# Patient Record
Sex: FEMALE | Race: WHITE | NOT HISPANIC OR LATINO | Employment: FULL TIME | URBAN - METROPOLITAN AREA
[De-identification: names, ages, dates, MRNs, and addresses within clinical notes are randomized per-mention and may not be internally consistent; named-entity substitution may affect disease eponyms.]

---

## 2023-11-21 DIAGNOSIS — H05.243 CONSTANT EXOPHTHALMOS OF BOTH EYES: Primary | ICD-10-CM

## 2023-11-21 DIAGNOSIS — Z00.00 WELLNESS EXAMINATION: ICD-10-CM

## 2023-12-07 DIAGNOSIS — K59.09 CHRONIC CONSTIPATION: Primary | ICD-10-CM

## 2023-12-13 ENCOUNTER — LAB (OUTPATIENT)
Dept: LAB | Facility: LAB | Age: 48
End: 2023-12-13
Payer: COMMERCIAL

## 2023-12-13 DIAGNOSIS — H05.243 CONSTANT EXOPHTHALMOS OF BOTH EYES: ICD-10-CM

## 2023-12-13 DIAGNOSIS — Z00.00 WELLNESS EXAMINATION: ICD-10-CM

## 2023-12-13 LAB
25(OH)D3 SERPL-MCNC: 31 NG/ML (ref 30–100)
ALBUMIN SERPL BCP-MCNC: 3.6 G/DL (ref 3.4–5)
ALP SERPL-CCNC: 46 U/L (ref 33–110)
ALT SERPL W P-5'-P-CCNC: 7 U/L (ref 7–45)
ANION GAP SERPL CALC-SCNC: 10 MMOL/L (ref 10–20)
AST SERPL W P-5'-P-CCNC: 11 U/L (ref 9–39)
BASOPHILS # BLD AUTO: 0.04 X10*3/UL (ref 0–0.1)
BASOPHILS NFR BLD AUTO: 0.6 %
BILIRUB SERPL-MCNC: 0.4 MG/DL (ref 0–1.2)
BUN SERPL-MCNC: 11 MG/DL (ref 6–23)
CALCIUM SERPL-MCNC: 9.1 MG/DL (ref 8.6–10.6)
CHLORIDE SERPL-SCNC: 108 MMOL/L (ref 98–107)
CHOLEST SERPL-MCNC: 150 MG/DL (ref 0–199)
CHOLESTEROL/HDL RATIO: 4.4
CO2 SERPL-SCNC: 25 MMOL/L (ref 21–32)
CREAT SERPL-MCNC: 0.82 MG/DL (ref 0.5–1.05)
EOSINOPHIL # BLD AUTO: 0.15 X10*3/UL (ref 0–0.7)
EOSINOPHIL NFR BLD AUTO: 2.4 %
ERYTHROCYTE [DISTWIDTH] IN BLOOD BY AUTOMATED COUNT: 12.4 % (ref 11.5–14.5)
EST. AVERAGE GLUCOSE BLD GHB EST-MCNC: 103 MG/DL
GFR SERPL CREATININE-BSD FRML MDRD: 89 ML/MIN/1.73M*2
GLUCOSE SERPL-MCNC: 81 MG/DL (ref 74–99)
HBA1C MFR BLD: 5.2 %
HCT VFR BLD AUTO: 36.4 % (ref 36–46)
HDLC SERPL-MCNC: 34.3 MG/DL
HGB BLD-MCNC: 11.3 G/DL (ref 12–16)
IMM GRANULOCYTES # BLD AUTO: 0.03 X10*3/UL (ref 0–0.7)
IMM GRANULOCYTES NFR BLD AUTO: 0.5 % (ref 0–0.9)
LDLC SERPL CALC-MCNC: 102 MG/DL
LYMPHOCYTES # BLD AUTO: 1.33 X10*3/UL (ref 1.2–4.8)
LYMPHOCYTES NFR BLD AUTO: 21.3 %
MCH RBC QN AUTO: 28.3 PG (ref 26–34)
MCHC RBC AUTO-ENTMCNC: 31 G/DL (ref 32–36)
MCV RBC AUTO: 91 FL (ref 80–100)
MONOCYTES # BLD AUTO: 0.69 X10*3/UL (ref 0.1–1)
MONOCYTES NFR BLD AUTO: 11 %
NEUTROPHILS # BLD AUTO: 4.01 X10*3/UL (ref 1.2–7.7)
NEUTROPHILS NFR BLD AUTO: 64.2 %
NON HDL CHOLESTEROL: 116 MG/DL (ref 0–149)
NRBC BLD-RTO: 0 /100 WBCS (ref 0–0)
PLATELET # BLD AUTO: 317 X10*3/UL (ref 150–450)
POTASSIUM SERPL-SCNC: 4.5 MMOL/L (ref 3.5–5.3)
PROT SERPL-MCNC: 6.8 G/DL (ref 6.4–8.2)
RBC # BLD AUTO: 3.99 X10*6/UL (ref 4–5.2)
SODIUM SERPL-SCNC: 138 MMOL/L (ref 136–145)
TRIGL SERPL-MCNC: 68 MG/DL (ref 0–149)
TSH SERPL-ACNC: 1.84 MIU/L (ref 0.44–3.98)
VLDL: 14 MG/DL (ref 0–40)
WBC # BLD AUTO: 6.3 X10*3/UL (ref 4.4–11.3)

## 2023-12-13 PROCEDURE — 85025 COMPLETE CBC W/AUTO DIFF WBC: CPT

## 2023-12-13 PROCEDURE — 84443 ASSAY THYROID STIM HORMONE: CPT

## 2023-12-13 PROCEDURE — 84445 ASSAY OF TSI GLOBULIN: CPT

## 2023-12-13 PROCEDURE — 82306 VITAMIN D 25 HYDROXY: CPT

## 2023-12-13 PROCEDURE — 80061 LIPID PANEL: CPT

## 2023-12-13 PROCEDURE — 36415 COLL VENOUS BLD VENIPUNCTURE: CPT

## 2023-12-13 PROCEDURE — 80053 COMPREHEN METABOLIC PANEL: CPT

## 2023-12-13 PROCEDURE — 83036 HEMOGLOBIN GLYCOSYLATED A1C: CPT

## 2023-12-14 DIAGNOSIS — D64.9 ANEMIA, UNSPECIFIED TYPE: Primary | ICD-10-CM

## 2023-12-19 LAB — TSI SER-ACNC: <1 TSI INDEX

## 2023-12-27 ENCOUNTER — LAB (OUTPATIENT)
Dept: LAB | Facility: LAB | Age: 48
End: 2023-12-27
Payer: COMMERCIAL

## 2023-12-27 DIAGNOSIS — D64.9 ANEMIA, UNSPECIFIED TYPE: ICD-10-CM

## 2023-12-27 LAB
BASOPHILS # BLD AUTO: 0.04 X10*3/UL (ref 0–0.1)
BASOPHILS NFR BLD AUTO: 0.7 %
EOSINOPHIL # BLD AUTO: 0.13 X10*3/UL (ref 0–0.7)
EOSINOPHIL NFR BLD AUTO: 2.1 %
ERYTHROCYTE [DISTWIDTH] IN BLOOD BY AUTOMATED COUNT: 12.8 % (ref 11.5–14.5)
FERRITIN SERPL-MCNC: 20 NG/ML (ref 8–150)
FOLATE SERPL-MCNC: 16.4 NG/ML
HCT VFR BLD AUTO: 36.8 % (ref 36–46)
HGB BLD-MCNC: 11.4 G/DL (ref 12–16)
IMM GRANULOCYTES # BLD AUTO: 0.03 X10*3/UL (ref 0–0.7)
IMM GRANULOCYTES NFR BLD AUTO: 0.5 % (ref 0–0.9)
IRON SATN MFR SERPL: 19 % (ref 25–45)
IRON SERPL-MCNC: 76 UG/DL (ref 35–150)
LYMPHOCYTES # BLD AUTO: 1.61 X10*3/UL (ref 1.2–4.8)
LYMPHOCYTES NFR BLD AUTO: 26.4 %
MCH RBC QN AUTO: 27.8 PG (ref 26–34)
MCHC RBC AUTO-ENTMCNC: 31 G/DL (ref 32–36)
MCV RBC AUTO: 90 FL (ref 80–100)
MONOCYTES # BLD AUTO: 0.68 X10*3/UL (ref 0.1–1)
MONOCYTES NFR BLD AUTO: 11.1 %
NEUTROPHILS # BLD AUTO: 3.62 X10*3/UL (ref 1.2–7.7)
NEUTROPHILS NFR BLD AUTO: 59.2 %
NRBC BLD-RTO: 0 /100 WBCS (ref 0–0)
PLATELET # BLD AUTO: 297 X10*3/UL (ref 150–450)
PROT SERPL-MCNC: 6.7 G/DL (ref 6.4–8.2)
RBC # BLD AUTO: 4.1 X10*6/UL (ref 4–5.2)
TIBC SERPL-MCNC: 391 UG/DL (ref 240–445)
UIBC SERPL-MCNC: 315 UG/DL (ref 110–370)
VIT B12 SERPL-MCNC: 241 PG/ML (ref 211–911)
WBC # BLD AUTO: 6.1 X10*3/UL (ref 4.4–11.3)

## 2023-12-27 PROCEDURE — 83550 IRON BINDING TEST: CPT

## 2023-12-27 PROCEDURE — 84165 PROTEIN E-PHORESIS SERUM: CPT

## 2023-12-27 PROCEDURE — 82746 ASSAY OF FOLIC ACID SERUM: CPT

## 2023-12-27 PROCEDURE — 36415 COLL VENOUS BLD VENIPUNCTURE: CPT

## 2023-12-27 PROCEDURE — 82728 ASSAY OF FERRITIN: CPT

## 2023-12-27 PROCEDURE — 85025 COMPLETE CBC W/AUTO DIFF WBC: CPT

## 2023-12-27 PROCEDURE — 84165 PROTEIN E-PHORESIS SERUM: CPT | Performed by: INTERNAL MEDICINE

## 2023-12-27 PROCEDURE — 84155 ASSAY OF PROTEIN SERUM: CPT

## 2023-12-27 PROCEDURE — 82607 VITAMIN B-12: CPT

## 2023-12-27 PROCEDURE — 83540 ASSAY OF IRON: CPT

## 2024-01-01 LAB
ALBUMIN: 3.8 G/DL (ref 3.4–5)
ALPHA 1 GLOBULIN: 0.3 G/DL (ref 0.2–0.6)
ALPHA 2 GLOBULIN: 0.6 G/DL (ref 0.4–1.1)
BETA GLOBULIN: 0.7 G/DL (ref 0.5–1.2)
GAMMA GLOBULIN: 1.3 G/DL (ref 0.5–1.4)
PATH REVIEW-SERUM PROTEIN ELECTROPHORESIS: NORMAL
PROTEIN ELECTROPHORESIS COMMENT: NORMAL

## 2024-01-02 ENCOUNTER — TELEPHONE (OUTPATIENT)
Dept: PRIMARY CARE | Facility: CLINIC | Age: 49
End: 2024-01-02
Payer: COMMERCIAL

## 2024-01-02 DIAGNOSIS — D51.0 PERNICIOUS ANEMIA: Primary | ICD-10-CM

## 2024-01-02 RX ORDER — CYANOCOBALAMIN 1000 UG/ML
1000 INJECTION, SOLUTION INTRAMUSCULAR; SUBCUTANEOUS
Qty: 4 ML | Refills: 3 | Status: SHIPPED | OUTPATIENT
Start: 2024-01-02 | End: 2024-06-03 | Stop reason: ALTCHOICE

## 2024-01-15 ENCOUNTER — CLINICAL SUPPORT (OUTPATIENT)
Dept: PRIMARY CARE | Facility: CLINIC | Age: 49
End: 2024-01-15
Payer: COMMERCIAL

## 2024-01-15 DIAGNOSIS — E53.8 VITAMIN B12 DEFICIENCY: Primary | ICD-10-CM

## 2024-01-15 PROCEDURE — 96372 THER/PROPH/DIAG INJ SC/IM: CPT | Performed by: INTERNAL MEDICINE

## 2024-01-15 RX ORDER — CYANOCOBALAMIN 1000 UG/ML
1000 INJECTION, SOLUTION INTRAMUSCULAR; SUBCUTANEOUS ONCE
Status: COMPLETED | OUTPATIENT
Start: 2024-01-15 | End: 2024-01-15

## 2024-01-15 RX ADMIN — CYANOCOBALAMIN 1000 MCG: 1000 INJECTION, SOLUTION INTRAMUSCULAR; SUBCUTANEOUS at 10:17

## 2024-01-16 PROBLEM — K31.9 STOMACH PROBLEMS: Status: ACTIVE | Noted: 2024-01-16

## 2024-01-16 PROBLEM — R29.6 RECURRENT FALLS: Status: ACTIVE | Noted: 2024-01-16

## 2024-01-16 PROBLEM — Z15.09 BRCA POSITIVE: Status: ACTIVE | Noted: 2024-01-16

## 2024-01-16 PROBLEM — E73.9 LACTOSE INTOLERANCE: Status: ACTIVE | Noted: 2024-01-16

## 2024-01-16 PROBLEM — J30.89 ENVIRONMENTAL AND SEASONAL ALLERGIES: Status: ACTIVE | Noted: 2024-01-16

## 2024-01-16 PROBLEM — G43.009 MIGRAINE WITHOUT AURA AND WITHOUT STATUS MIGRAINOSUS, NOT INTRACTABLE: Status: ACTIVE | Noted: 2024-01-16

## 2024-01-16 PROBLEM — K52.9 CHRONIC DIARRHEA: Status: ACTIVE | Noted: 2024-01-16

## 2024-01-16 PROBLEM — R09.81 NASAL CONGESTION: Status: ACTIVE | Noted: 2024-01-16

## 2024-01-16 PROBLEM — G50.0 TRIGEMINAL NEURALGIA: Status: ACTIVE | Noted: 2022-10-21

## 2024-01-16 PROBLEM — R51.9 SEVERE FRONTAL HEADACHES: Status: ACTIVE | Noted: 2024-01-16

## 2024-01-16 PROBLEM — B34.9 VIRAL INFECTION: Status: ACTIVE | Noted: 2024-01-16

## 2024-01-16 PROBLEM — R10.9 ABDOMINAL PAIN: Status: ACTIVE | Noted: 2024-01-16

## 2024-01-16 PROBLEM — E78.6 LOW HDL (UNDER 40): Status: ACTIVE | Noted: 2024-01-16

## 2024-01-16 PROBLEM — H66.90 CHRONIC OTITIS MEDIA: Status: ACTIVE | Noted: 2024-01-16

## 2024-01-16 PROBLEM — Z15.01 BRCA POSITIVE: Status: ACTIVE | Noted: 2024-01-16

## 2024-01-16 PROBLEM — R76.8 SS-A ANTIBODY POSITIVE: Status: ACTIVE | Noted: 2024-01-16

## 2024-01-16 PROBLEM — R93.0 ABNORMAL MRI OF THE HEAD: Status: ACTIVE | Noted: 2024-01-16

## 2024-01-16 PROBLEM — R10.13 DYSPEPSIA: Status: ACTIVE | Noted: 2024-01-16

## 2024-01-16 PROBLEM — K64.9 HEMORRHOID: Status: ACTIVE | Noted: 2024-01-16

## 2024-01-16 PROBLEM — M79.643 HAND PAIN: Status: ACTIVE | Noted: 2024-01-16

## 2024-01-16 PROBLEM — L40.9 PSORIASIS: Status: ACTIVE | Noted: 2024-01-16

## 2024-01-16 PROBLEM — H61.20 EXCESSIVE CERUMEN IN EAR CANAL: Status: ACTIVE | Noted: 2024-01-16

## 2024-01-16 PROBLEM — Z15.01 POSITIVE TEST FOR GENETIC MARKER OF SUSCEPTIBILITY TO MALIGNANT NEOPLASM OF BREAST: Status: ACTIVE | Noted: 2024-01-16

## 2024-01-16 PROBLEM — K62.5 RECTAL HEMORRHAGE: Status: ACTIVE | Noted: 2024-01-16

## 2024-01-16 PROBLEM — H61.23 EXCESSIVE EAR WAX, BILATERAL: Status: ACTIVE | Noted: 2024-01-16

## 2024-01-16 PROBLEM — K58.9 IBS (IRRITABLE BOWEL SYNDROME): Status: ACTIVE | Noted: 2024-01-16

## 2024-01-16 RX ORDER — LOPERAMIDE HYDROCHLORIDE 2 MG/1
CAPSULE ORAL
COMMUNITY
End: 2024-06-03 | Stop reason: WASHOUT

## 2024-01-16 RX ORDER — RIZATRIPTAN BENZOATE 10 MG/1
TABLET, ORALLY DISINTEGRATING ORAL
COMMUNITY
Start: 2022-10-03 | End: 2024-01-17 | Stop reason: ALTCHOICE

## 2024-01-16 RX ORDER — ASPIRIN 81 MG/1
81 TABLET ORAL
COMMUNITY
End: 2024-01-17 | Stop reason: ALTCHOICE

## 2024-01-16 RX ORDER — OMEGA-3 FATTY ACIDS/FISH OIL 300-500 MG
CAPSULE ORAL
COMMUNITY

## 2024-01-16 RX ORDER — FLUTICASONE PROPIONATE 50 MCG
SPRAY, SUSPENSION (ML) NASAL
COMMUNITY
Start: 2020-01-07 | End: 2024-01-17 | Stop reason: ALTCHOICE

## 2024-01-16 RX ORDER — UBROGEPANT 100 MG/1
TABLET ORAL
COMMUNITY
Start: 2023-01-24

## 2024-01-16 RX ORDER — MAG HYDROX/ALUMINUM HYD/SIMETH 200-200-20
1 SUSPENSION, ORAL (FINAL DOSE FORM) ORAL 2 TIMES DAILY
COMMUNITY
Start: 2023-06-12

## 2024-01-16 RX ORDER — TITANIUM DIOXIDE, OCTINOXATE, ZINC OXIDE 4.61; 1.6; .78 G/40ML; G/40ML; G/40ML
CREAM TOPICAL
COMMUNITY
Start: 2022-10-03

## 2024-01-16 RX ORDER — VALACYCLOVIR HYDROCHLORIDE 1 G/1
TABLET, FILM COATED ORAL
COMMUNITY
Start: 2020-09-17

## 2024-01-16 RX ORDER — CHOLECALCIFEROL (VITAMIN D3) 25 MCG
TABLET ORAL
COMMUNITY
Start: 2020-09-17

## 2024-01-16 RX ORDER — DICYCLOMINE HYDROCHLORIDE 10 MG/1
1 CAPSULE ORAL EVERY 6 HOURS PRN
COMMUNITY
Start: 2022-10-03 | End: 2024-01-17 | Stop reason: ALTCHOICE

## 2024-01-16 RX ORDER — POLYETHYLENE GLYCOL 3350, SODIUM CHLORIDE, SODIUM BICARBONATE, POTASSIUM CHLORIDE 420; 11.2; 5.72; 1.48 G/4L; G/4L; G/4L; G/4L
POWDER, FOR SOLUTION ORAL
COMMUNITY
Start: 2023-06-12 | End: 2024-01-17 | Stop reason: ALTCHOICE

## 2024-01-16 RX ORDER — POLYETHYLENE GLYCOL 3350 17 G/17G
POWDER, FOR SOLUTION ORAL
COMMUNITY
Start: 2023-06-12 | End: 2024-01-17 | Stop reason: ALTCHOICE

## 2024-01-16 RX ORDER — MULTIVITAMIN WITH IRON
TABLET ORAL
COMMUNITY
Start: 2020-09-17

## 2024-01-17 ENCOUNTER — OFFICE VISIT (OUTPATIENT)
Dept: PRIMARY CARE | Facility: CLINIC | Age: 49
End: 2024-01-17
Payer: COMMERCIAL

## 2024-01-17 VITALS
TEMPERATURE: 97.3 F | HEIGHT: 69 IN | WEIGHT: 179 LBS | SYSTOLIC BLOOD PRESSURE: 120 MMHG | BODY MASS INDEX: 26.51 KG/M2 | DIASTOLIC BLOOD PRESSURE: 70 MMHG | OXYGEN SATURATION: 98 % | HEART RATE: 88 BPM

## 2024-01-17 DIAGNOSIS — E53.8 B12 DEFICIENCY: Primary | ICD-10-CM

## 2024-01-17 DIAGNOSIS — E55.9 VITAMIN D DEFICIENCY: ICD-10-CM

## 2024-01-17 PROCEDURE — 99213 OFFICE O/P EST LOW 20 MIN: CPT | Performed by: INTERNAL MEDICINE

## 2024-01-17 RX ORDER — GLUCOSAMINE SULFATE 500 MG
TABLET ORAL
COMMUNITY

## 2024-01-17 NOTE — PROGRESS NOTES
"Subjective   Patient ID: Cortney Kaufman is a 48 y.o. female who presents for Follow-up.    HPI   Fu b12 deficiency  Started rx. Maybe small improvement      Review of Systems  Some fatigue and sadness  Mild    Objective   /70   Pulse 88   Temp 36.3 °C (97.3 °F)   Ht 1.753 m (5' 9\")   Wt 81.2 kg (179 lb)   SpO2 98%   BMI 26.43 kg/m²     Physical Exam  GEN nad, Affect wnl  Heent ncat, eomfg, face symmetric  Skin good color  Resp breathing easily  No p/m retardation or agitation  Thinking appropriate  Oriented    Assessment/Plan   Diagnoses and all orders for this visit:  B12 deficiency  -     CBC and Auto Differential; Future  -     Vitamin B12; Future  -     Anti-Parietal Cell Antibody; Future  Vitamin D deficiency  -     Vitamin D 25-Hydroxy,Total (for eval of Vitamin D levels); Future    Reviewed gyn testing, genetic issues, labs  Rec consider radha el of genetics  B12 shots monthly rec  Fu labs ordered    "

## 2024-01-23 DIAGNOSIS — Z12.11 COLON CANCER SCREENING: Primary | ICD-10-CM

## 2024-01-23 RX ORDER — POLYETHYLENE GLYCOL 3350, SODIUM SULFATE ANHYDROUS, SODIUM BICARBONATE, SODIUM CHLORIDE, POTASSIUM CHLORIDE 236; 22.74; 6.74; 5.86; 2.97 G/4L; G/4L; G/4L; G/4L; G/4L
4000 POWDER, FOR SOLUTION ORAL ONCE
Qty: 4000 ML | Refills: 0 | Status: SHIPPED | OUTPATIENT
Start: 2024-01-23 | End: 2024-01-23

## 2024-02-12 DIAGNOSIS — K59.09 CHRONIC CONSTIPATION: ICD-10-CM

## 2024-02-12 DIAGNOSIS — K58.1 IRRITABLE BOWEL SYNDROME WITH CONSTIPATION: Primary | ICD-10-CM

## 2024-02-13 ENCOUNTER — TELEPHONE (OUTPATIENT)
Dept: GASTROENTEROLOGY | Facility: CLINIC | Age: 49
End: 2024-02-13
Payer: COMMERCIAL

## 2024-02-13 NOTE — TELEPHONE ENCOUNTER
Pt called asking if we can add EGD to her colonoscopy on Thursday because she has been having abd pain. And she would rather want both at same time if she needed to have an EGD

## 2024-02-15 ENCOUNTER — APPOINTMENT (OUTPATIENT)
Dept: GASTROENTEROLOGY | Facility: EXTERNAL LOCATION | Age: 49
End: 2024-02-15
Payer: COMMERCIAL

## 2024-02-19 ENCOUNTER — APPOINTMENT (OUTPATIENT)
Dept: PRIMARY CARE | Facility: CLINIC | Age: 49
End: 2024-02-19
Payer: COMMERCIAL

## 2024-02-20 ENCOUNTER — CLINICAL SUPPORT (OUTPATIENT)
Dept: PRIMARY CARE | Facility: CLINIC | Age: 49
End: 2024-02-20
Payer: COMMERCIAL

## 2024-02-20 DIAGNOSIS — K59.09 CHRONIC CONSTIPATION: Primary | ICD-10-CM

## 2024-02-20 DIAGNOSIS — E53.8 VITAMIN B12 DEFICIENCY: Primary | ICD-10-CM

## 2024-02-20 DIAGNOSIS — R10.9 ABDOMINAL PAIN, UNSPECIFIED ABDOMINAL LOCATION: Primary | ICD-10-CM

## 2024-02-20 PROCEDURE — 96372 THER/PROPH/DIAG INJ SC/IM: CPT | Performed by: INTERNAL MEDICINE

## 2024-02-20 RX ORDER — CYANOCOBALAMIN 1000 UG/ML
1000 INJECTION, SOLUTION INTRAMUSCULAR; SUBCUTANEOUS ONCE
Status: COMPLETED | OUTPATIENT
Start: 2024-02-20 | End: 2024-02-20

## 2024-02-20 RX ORDER — POLYETHYLENE GLYCOL 3350, SODIUM SULFATE ANHYDROUS, SODIUM BICARBONATE, SODIUM CHLORIDE, POTASSIUM CHLORIDE 236; 22.74; 6.74; 5.86; 2.97 G/4L; G/4L; G/4L; G/4L; G/4L
POWDER, FOR SOLUTION ORAL
Qty: 4000 ML | Refills: 0 | Status: SHIPPED | OUTPATIENT
Start: 2024-02-20 | End: 2024-06-03 | Stop reason: WASHOUT

## 2024-02-20 RX ADMIN — CYANOCOBALAMIN 1000 MCG: 1000 INJECTION, SOLUTION INTRAMUSCULAR; SUBCUTANEOUS at 10:55

## 2024-03-07 ENCOUNTER — APPOINTMENT (OUTPATIENT)
Dept: GASTROENTEROLOGY | Facility: EXTERNAL LOCATION | Age: 49
End: 2024-03-07
Payer: COMMERCIAL

## 2024-03-07 DIAGNOSIS — Z12.11 COLON CANCER SCREENING: Primary | ICD-10-CM

## 2024-03-07 RX ORDER — POLYETHYLENE GLYCOL-3350 AND ELECTROLYTES WITH FLAVOR PACK 240; 5.84; 2.98; 6.72; 22.72 G/278.26G; G/278.26G; G/278.26G; G/278.26G; G/278.26G
4000 POWDER, FOR SOLUTION ORAL ONCE
Qty: 4000 ML | Refills: 0 | Status: SHIPPED | OUTPATIENT
Start: 2024-03-07 | End: 2024-03-07

## 2024-03-08 ENCOUNTER — OFFICE VISIT (OUTPATIENT)
Dept: GASTROENTEROLOGY | Facility: EXTERNAL LOCATION | Age: 49
End: 2024-03-08
Payer: COMMERCIAL

## 2024-03-08 DIAGNOSIS — K59.39 DILATATION OF COLON: ICD-10-CM

## 2024-03-08 DIAGNOSIS — R10.9 ABDOMINAL PAIN, UNSPECIFIED ABDOMINAL LOCATION: ICD-10-CM

## 2024-03-08 DIAGNOSIS — Z12.11 ENCOUNTER FOR SCREENING FOR MALIGNANT NEOPLASM OF COLON: ICD-10-CM

## 2024-03-08 DIAGNOSIS — K22.89 ESOPHAGEAL PAIN: ICD-10-CM

## 2024-03-08 DIAGNOSIS — K58.9 IRRITABLE BOWEL SYNDROME WITHOUT DIARRHEA: Primary | ICD-10-CM

## 2024-03-08 DIAGNOSIS — K64.8 INTERNAL HEMORRHOIDS WITHOUT COMPLICATION: ICD-10-CM

## 2024-03-08 DIAGNOSIS — K31.7 GASTRIC POLYP: ICD-10-CM

## 2024-03-08 DIAGNOSIS — R10.9 STOMACH ACHE: ICD-10-CM

## 2024-03-08 PROCEDURE — 45378 DIAGNOSTIC COLONOSCOPY: CPT | Performed by: INTERNAL MEDICINE

## 2024-03-08 PROCEDURE — 43239 EGD BIOPSY SINGLE/MULTIPLE: CPT | Performed by: INTERNAL MEDICINE

## 2024-03-08 PROCEDURE — 88305 TISSUE EXAM BY PATHOLOGIST: CPT | Performed by: PATHOLOGY

## 2024-03-12 ENCOUNTER — LAB REQUISITION (OUTPATIENT)
Dept: LAB | Facility: HOSPITAL | Age: 49
End: 2024-03-12
Payer: COMMERCIAL

## 2024-03-13 ENCOUNTER — TELEPHONE (OUTPATIENT)
Dept: GASTROENTEROLOGY | Facility: CLINIC | Age: 49
End: 2024-03-13
Payer: COMMERCIAL

## 2024-03-13 NOTE — TELEPHONE ENCOUNTER
Pt called asking if you can order the 290 linzess because she will be running out soon, also, wanted to know how often she should use the metamucil along with the linzess?

## 2024-03-14 DIAGNOSIS — K59.04 CHRONIC IDIOPATHIC CONSTIPATION: Primary | ICD-10-CM

## 2024-03-15 LAB
LABORATORY COMMENT REPORT: NORMAL
PATH REPORT.FINAL DX SPEC: NORMAL
PATH REPORT.GROSS SPEC: NORMAL
PATH REPORT.RELEVANT HX SPEC: NORMAL
PATH REPORT.TOTAL CANCER: NORMAL

## 2024-03-18 ENCOUNTER — CLINICAL SUPPORT (OUTPATIENT)
Dept: PRIMARY CARE | Facility: CLINIC | Age: 49
End: 2024-03-18
Payer: COMMERCIAL

## 2024-03-18 DIAGNOSIS — E53.8 VITAMIN B12 DEFICIENCY: Primary | ICD-10-CM

## 2024-03-18 PROCEDURE — 96372 THER/PROPH/DIAG INJ SC/IM: CPT | Performed by: INTERNAL MEDICINE

## 2024-03-18 RX ORDER — CYANOCOBALAMIN 1000 UG/ML
1000 INJECTION, SOLUTION INTRAMUSCULAR; SUBCUTANEOUS ONCE
Status: COMPLETED | OUTPATIENT
Start: 2024-03-18 | End: 2024-03-18

## 2024-03-18 RX ADMIN — CYANOCOBALAMIN 1000 MCG: 1000 INJECTION, SOLUTION INTRAMUSCULAR; SUBCUTANEOUS at 08:38

## 2024-03-18 NOTE — PROGRESS NOTES
Pt came in on the nurse's schedule for Vitamin B12 injection. Pt supplied rx and no complications.

## 2024-04-10 ENCOUNTER — LAB (OUTPATIENT)
Dept: LAB | Facility: LAB | Age: 49
End: 2024-04-10
Payer: COMMERCIAL

## 2024-04-10 ENCOUNTER — TELEPHONE (OUTPATIENT)
Dept: GASTROENTEROLOGY | Facility: CLINIC | Age: 49
End: 2024-04-10

## 2024-04-10 DIAGNOSIS — R10.9 ABDOMINAL PAIN, UNSPECIFIED ABDOMINAL LOCATION: ICD-10-CM

## 2024-04-10 DIAGNOSIS — E55.9 VITAMIN D DEFICIENCY: ICD-10-CM

## 2024-04-10 DIAGNOSIS — E53.8 B12 DEFICIENCY: ICD-10-CM

## 2024-04-10 LAB
25(OH)D3 SERPL-MCNC: 41 NG/ML (ref 30–100)
BASOPHILS # BLD AUTO: 0.03 X10*3/UL (ref 0–0.1)
BASOPHILS NFR BLD AUTO: 0.5 %
EOSINOPHIL # BLD AUTO: 0.13 X10*3/UL (ref 0–0.7)
EOSINOPHIL NFR BLD AUTO: 2.2 %
ERYTHROCYTE [DISTWIDTH] IN BLOOD BY AUTOMATED COUNT: 14.6 % (ref 11.5–14.5)
HCT VFR BLD AUTO: 36.9 % (ref 36–46)
HGB BLD-MCNC: 11.5 G/DL (ref 12–16)
IMM GRANULOCYTES # BLD AUTO: 0.02 X10*3/UL (ref 0–0.7)
IMM GRANULOCYTES NFR BLD AUTO: 0.3 % (ref 0–0.9)
LYMPHOCYTES # BLD AUTO: 1.23 X10*3/UL (ref 1.2–4.8)
LYMPHOCYTES NFR BLD AUTO: 20.6 %
MCH RBC QN AUTO: 28.5 PG (ref 26–34)
MCHC RBC AUTO-ENTMCNC: 31.2 G/DL (ref 32–36)
MCV RBC AUTO: 92 FL (ref 80–100)
MONOCYTES # BLD AUTO: 0.63 X10*3/UL (ref 0.1–1)
MONOCYTES NFR BLD AUTO: 10.5 %
NEUTROPHILS # BLD AUTO: 3.94 X10*3/UL (ref 1.2–7.7)
NEUTROPHILS NFR BLD AUTO: 65.9 %
NRBC BLD-RTO: 0 /100 WBCS (ref 0–0)
PLATELET # BLD AUTO: 309 X10*3/UL (ref 150–450)
RBC # BLD AUTO: 4.03 X10*6/UL (ref 4–5.2)
TTG IGA SER IA-ACNC: <1 U/ML
VIT B12 SERPL-MCNC: 373 PG/ML (ref 211–911)
WBC # BLD AUTO: 6 X10*3/UL (ref 4.4–11.3)

## 2024-04-10 PROCEDURE — 85025 COMPLETE CBC W/AUTO DIFF WBC: CPT

## 2024-04-10 PROCEDURE — 83516 IMMUNOASSAY NONANTIBODY: CPT

## 2024-04-10 PROCEDURE — 82306 VITAMIN D 25 HYDROXY: CPT

## 2024-04-10 PROCEDURE — 36415 COLL VENOUS BLD VENIPUNCTURE: CPT

## 2024-04-10 PROCEDURE — 86255 FLUORESCENT ANTIBODY SCREEN: CPT

## 2024-04-10 PROCEDURE — 82607 VITAMIN B-12: CPT

## 2024-04-10 NOTE — TELEPHONE ENCOUNTER
Pt called to update Dr. Olsen on how things have been going with the linzess. She had been taking it and seemed to help a little bit,  but didn't feel it helped completley, she went to a conference for work and was out of her normal routine and began to get constipated. Then Sunday she woke up at 3 am and was extremely nauseous, she took zofran and then the next 2 days she had a migraine with nashua and took zofran again. She hs become more constipated and has not been able to eat anything since Tuesday. Tuesday she started to take metamucil along with the linzess, but she is concerned because she has not had a Bowel movement since Friday. She would like some recommendations as to what she should do.

## 2024-04-10 NOTE — TELEPHONE ENCOUNTER
Had been doing well on Linzess 290 mcg.  While travelling got more constipated and then nauseated.  Some left sided abdominal pain. Taking lola lozenges which help some as is maybe the Zofran.  Told her to take 3 doses of MiraLAX tonight, repeat in the morning if no action, and use a fleets enema if that does not work.

## 2024-04-12 LAB
PCA IGG SER-ACNC: 18.7 UNITS (ref 0–24.9)
TTG IGG SER IA-ACNC: <0.82 FLU (ref 0–4.99)

## 2024-04-15 ENCOUNTER — TELEPHONE (OUTPATIENT)
Dept: GASTROENTEROLOGY | Facility: CLINIC | Age: 49
End: 2024-04-15
Payer: COMMERCIAL

## 2024-04-15 NOTE — TELEPHONE ENCOUNTER
Pt called this morning, she has been dealing with a lot of constipation, she had called last week and Dr. Amaral gave her instructions to do 3 doses of miralax in addition to her linzess 290. So she did over the weekend and still has not had a bowel movement. She has a D&C procedure schedule tomorrow and she is really concerned about being constipated. She wants to know what you recommend.

## 2024-04-17 ENCOUNTER — OFFICE VISIT (OUTPATIENT)
Dept: PRIMARY CARE | Facility: CLINIC | Age: 49
End: 2024-04-17
Payer: COMMERCIAL

## 2024-04-17 VITALS
BODY MASS INDEX: 25.92 KG/M2 | SYSTOLIC BLOOD PRESSURE: 126 MMHG | TEMPERATURE: 97.3 F | OXYGEN SATURATION: 100 % | HEART RATE: 90 BPM | HEIGHT: 69 IN | WEIGHT: 175 LBS | DIASTOLIC BLOOD PRESSURE: 76 MMHG

## 2024-04-17 DIAGNOSIS — E55.9 VITAMIN D DEFICIENCY: ICD-10-CM

## 2024-04-17 DIAGNOSIS — E53.8 VITAMIN B12 DEFICIENCY: Primary | ICD-10-CM

## 2024-04-17 PROCEDURE — 1036F TOBACCO NON-USER: CPT | Performed by: INTERNAL MEDICINE

## 2024-04-17 PROCEDURE — 99213 OFFICE O/P EST LOW 20 MIN: CPT | Performed by: INTERNAL MEDICINE

## 2024-04-17 RX ORDER — ONDANSETRON 4 MG/1
4 TABLET, FILM COATED ORAL EVERY 8 HOURS PRN
COMMUNITY

## 2024-04-17 RX ORDER — IBUPROFEN 600 MG/1
600 TABLET ORAL EVERY 6 HOURS PRN
COMMUNITY

## 2024-04-17 NOTE — PROGRESS NOTES
"Subjective   Patient ID: Cortney Kaufman is a 48 y.o. female who presents for Follow-up.    HPI fu pa, vit d def, ibs, ssa pos, tgn,   Had hysteroscopy, dil and curr    Review of Systems  Felt better w/ b12 and vitamin d    Objective   /76   Pulse 90   Temp 36.3 °C (97.3 °F)   Ht 1.753 m (5' 9\")   Wt 79.4 kg (175 lb)   SpO2 100%   BMI 25.84 kg/m²     Physical Exam  Gen nad, affect wnl  Heentt eomfg, face symmetric, ncat  Neck w/o la, tm, bruit  Lungs clear   Cv rrr nl s1, s2  Ext w/o edema  Neuro grossly nonfocal  Skin good color  Reviewed labs    Assessment/Plan   Diagnoses and all orders for this visit:  Vitamin B12 deficiency  -     Vitamin B12; Future  Vitamin D deficiency  -     Vitamin D 25-Hydroxy,Total (for eval of Vitamin D levels); Future     Pt wishes to go off shots and on oral b12  On 4k vit d 3  Take oral b12  Fu 6 mos w/ labs  Fu specialists  "

## 2024-06-03 ENCOUNTER — OFFICE VISIT (OUTPATIENT)
Dept: GASTROENTEROLOGY | Facility: CLINIC | Age: 49
End: 2024-06-03
Payer: COMMERCIAL

## 2024-06-03 VITALS — HEART RATE: 93 BPM | BODY MASS INDEX: 27.11 KG/M2 | WEIGHT: 183 LBS | HEIGHT: 69 IN | OXYGEN SATURATION: 97 %

## 2024-06-03 DIAGNOSIS — K59.04 CHRONIC IDIOPATHIC CONSTIPATION: ICD-10-CM

## 2024-06-03 DIAGNOSIS — K58.1 IRRITABLE BOWEL SYNDROME WITH CONSTIPATION: Primary | ICD-10-CM

## 2024-06-03 PROCEDURE — 99214 OFFICE O/P EST MOD 30 MIN: CPT | Performed by: INTERNAL MEDICINE

## 2024-06-03 PROCEDURE — 1036F TOBACCO NON-USER: CPT | Performed by: INTERNAL MEDICINE

## 2024-06-03 NOTE — PROGRESS NOTES
Subjective     History of Present Illness:     49yo with h/o BRCA +, IBS, psoriasis, trigeminal neuralgia, chronic constipation here for follow up. Last seen in office 11/2019 for rectal bleeding, diarrhea. Stool studies negative. X-ray showed moderate stool throughout colon.  Last sen by NP Flick 6/2023 with c/o extreme back and abdominal gas pains. Was taking miralax prn, flax and tanvi seeds with bms once every few days formed then loose. Occsional blood on tissue due to hemorrhoids.   Egd 3/8/24- normal esophagus, fundic gland polyps, normal duodenum. Bx gastric and duodenum normal.  Colonoscopy 3/8/24- redundant and tortuous with significant looping; dilated lumen, internal hemorrhoids, normal TI; otherwise normal.  Given Linzess 290 mcg at colonocopy. Has been doing well since colonoscopy. Taking linzess 290 daily with good results.  Bms are most days formed, at times will have only liquid stools. Some days will feel incomplete evacuation.   No abdominal pain but rarely which resolves with heat pad, much improved from prior.   No blood per rectum .    Some reflux due to dietary triggers with improvement with TUMS.  Does not occur often.       Allergies  Allergies   Allergen Reactions    Penicillins Other, Rash and Swelling     Swollen feet       Swelling on feet    Swelling on feet    Latex Itching, Other and Rash     Noticed irritation on mouth/cold sore after going to dentist        Noticed irritation on mouth/cold sore after going to dentist    Irritated skin     Irritated skin       Medications  Current Outpatient Medications   Medication Instructions    BACILLUS COAGULANS-INULIN ORAL oral    cholecalciferol (Vitamin D-3) 25 MCG (1000 UT) tablet oral    cranberry 400 mg capsule oral    cyanocobalamin (VITAMIN B-12) 1,000 mcg, intramuscular, Every 30 days    docosahexaenoic acid 200 mg capsule oral    hydrocortisone 1 % ointment 1 Application, topical (top), 2 times daily    ibuprofen 600 mg, oral, Every 6  hours PRN    linaCLOtide (LINZESS) 145 mcg, oral, Daily before breakfast, Do not crush or chew.    linaCLOtide (LINZESS) 290 mcg, oral, Daily before breakfast, Do not crush or chew.    loperamide (Imodium) 2 mg capsule oral    lysine 1,000 mg tablet oral, 1 daily     multivitamin (Multiple Vitamins) tablet oral    omega-3 fatty acids-fish oil (Fish OiL) 300-500 mg capsule oral    ondansetron (ZOFRAN) 4 mg, oral, Every 8 hours PRN    polyethylene glycol-electrolytes (Golytely) 420 gram solution Drink over 24 hours    Ubrelvy 100 mg tablet tablet TAKE 1 TABLET BY MOUTH MAY TAKE 2ND DOSE AT LEAST 2 HRS AFTER AS NEEDED ONCE DAILY    valACYclovir (Valtrex) 1 gram tablet oral    WHEAT DEXTRIN ORAL oral        Objective   There were no vitals taken for this visit.   Physical Exam  Vitals reviewed.   Constitutional:       General: She is awake.   Cardiovascular:      Rate and Rhythm: Normal rate and regular rhythm.   Pulmonary:      Effort: Pulmonary effort is normal.      Breath sounds: Normal breath sounds.   Abdominal:      General: Bowel sounds are normal.      Palpations: Abdomen is soft.      Tenderness: There is no abdominal tenderness.   Neurological:      Mental Status: She is alert and oriented to person, place, and time.   Psychiatric:         Attention and Perception: Attention and perception normal.         Behavior: Behavior normal.               Assessment/Plan:    49yo with h/o BRCA +, IBS, psoriasis, trigeminal neuralgia, chronic constipation here for follow up.  Much improved on linzess 290 with minimal abdominal pain. Still with days with some incomplete evacuation requiring 2-3 Bms , however much improved.  Recommend adding psyllium fiber daily and may need to add stimulant laxative few times per week .     Rec  Start psyllium fiber daily  Continue linzess 290  Try adding stimulant laxative as needed     Follow up 6 mo or sooner if needed     Ami Olsen MD

## 2024-06-14 ASSESSMENT — ENCOUNTER SYMPTOMS
HEADACHES: 0
NECK PAIN: 0
SYNCOPE: 0
SPUTUM PRODUCTION: 0
SWOLLEN GLANDS: 0
PND: 1
WHEEZING: 0
RHINORRHEA: 0
ABDOMINAL PAIN: 0
ORTHOPNEA: 1
LEG PAIN: 0
HEMOPTYSIS: 0
SORE THROAT: 0
CLAUDICATION: 0
SHORTNESS OF BREATH: 1
FEVER: 0
VOMITING: 0

## 2024-06-19 ENCOUNTER — APPOINTMENT (OUTPATIENT)
Dept: PRIMARY CARE | Facility: CLINIC | Age: 49
End: 2024-06-19
Payer: COMMERCIAL

## 2024-06-19 VITALS
HEART RATE: 85 BPM | OXYGEN SATURATION: 98 % | BODY MASS INDEX: 26.81 KG/M2 | HEIGHT: 69 IN | SYSTOLIC BLOOD PRESSURE: 122 MMHG | DIASTOLIC BLOOD PRESSURE: 72 MMHG | WEIGHT: 181 LBS

## 2024-06-19 DIAGNOSIS — R06.09 DOE (DYSPNEA ON EXERTION): ICD-10-CM

## 2024-06-19 DIAGNOSIS — R53.83 OTHER FATIGUE: ICD-10-CM

## 2024-06-19 DIAGNOSIS — R07.9 CHEST PAIN, UNSPECIFIED TYPE: Primary | ICD-10-CM

## 2024-06-19 PROCEDURE — 99214 OFFICE O/P EST MOD 30 MIN: CPT | Performed by: INTERNAL MEDICINE

## 2024-06-19 PROCEDURE — 93000 ELECTROCARDIOGRAM COMPLETE: CPT | Performed by: INTERNAL MEDICINE

## 2024-06-19 PROCEDURE — 1036F TOBACCO NON-USER: CPT | Performed by: INTERNAL MEDICINE

## 2024-06-19 RX ORDER — MULTIVITAMIN
TABLET ORAL EVERY 24 HOURS
COMMUNITY

## 2024-06-19 RX ORDER — LANOLIN ALCOHOL/MO/W.PET/CERES
1000 CREAM (GRAM) TOPICAL DAILY
COMMUNITY

## 2024-06-19 ASSESSMENT — ENCOUNTER SYMPTOMS
SORE THROAT: 0
WHEEZING: 0
NECK PAIN: 0
VOMITING: 0
LEG PAIN: 0
SYNCOPE: 0
ABDOMINAL PAIN: 0
CLAUDICATION: 0
HEMOPTYSIS: 0
ORTHOPNEA: 1
RHINORRHEA: 0
FEVER: 0
HEADACHES: 0
PND: 1
SHORTNESS OF BREATH: 1
SWOLLEN GLANDS: 0
SPUTUM PRODUCTION: 0

## 2024-06-19 NOTE — PROGRESS NOTES
"Subjective   Patient ID: Cortney Kaufman is a 48 y.o. female who presents for Anxiety (SOB intermittently. Pt c/o Anxiety. Pt c/o fatigue. ).    Shortness of Breath  The current episode started 1 to 4 weeks ago. The problem occurs intermittently. The problem has been gradually worsening. The average episode lasts 5 minutes. Associated symptoms include chest pain, coryza, orthopnea and PND. Pertinent negatives include no abdominal pain, claudication, ear pain, fever, headaches, hemoptysis, leg pain, leg swelling, neck pain, rash, rhinorrhea, sore throat, sputum production, swollen glands, syncope, vomiting or wheezing. The symptoms are aggravated by any activity.    presents early b/c of peña worse, some conversational sob at times  Getting chest tightness and sob w/ anxiety/stess. Some forgetfulness and fatigue    fu pa, vit d def, ibs, pos, tgn  Cold intolerance    Review of Systems   Constitutional:  Negative for fever.   HENT:  Negative for ear pain, rhinorrhea and sore throat.    Respiratory:  Positive for shortness of breath. Negative for hemoptysis, sputum production and wheezing.    Cardiovascular:  Positive for chest pain, orthopnea and PND. Negative for claudication, leg swelling and syncope.   Gastrointestinal:  Negative for abdominal pain and vomiting.   Musculoskeletal:  Negative for neck pain.   Skin:  Negative for rash.   Neurological:  Negative for headaches.     As above  Some blurry vision    Objective   /72   Pulse 85   Ht 1.753 m (5' 9\")   Wt 82.1 kg (181 lb)   SpO2 98%   BMI 26.73 kg/m²     Physical Exam  Gen nad, affect wnl  Heentt eomfg, face symmetric, ncat  Neck w/o la, tm, bruit  Lungs clear   Cv rrr nl s1, s2  Ext w/o edema  Neuro grossly nonfocal  Skin good color    Assessment/Plan   Diagnoses and all orders for this visit:  Chest pain, unspecified type  -     ECG 12 lead (Clinic Performed)  -     D-Dimer, VTE Exclusion; Future  -     Troponin I, High Sensitivity; Future  -     " CBC and Auto Differential; Future  MAGALLANES (dyspnea on exertion)  -     ECG 12 lead (Clinic Performed)  -     D-Dimer, VTE Exclusion; Future  -     Troponin I, High Sensitivity; Future  -     CBC and Auto Differential; Future  -     XR chest 2 views; Future  Other fatigue  -     Tsh With Reflex To Free T4 If Abnormal; Future  -     Vitamin B12; Future  Anxiety  IBS  B12 DEF     Famotidine 20 mg bid  Sertraline discussed. considering

## 2024-06-24 DIAGNOSIS — Z77.011 LEAD EXPOSURE: Primary | ICD-10-CM

## 2024-06-24 DIAGNOSIS — F32.A DEPRESSION, UNSPECIFIED DEPRESSION TYPE: ICD-10-CM

## 2024-06-24 RX ORDER — SERTRALINE HYDROCHLORIDE 50 MG/1
50 TABLET, FILM COATED ORAL DAILY
Qty: 30 TABLET | Refills: 1 | Status: SHIPPED | OUTPATIENT
Start: 2024-06-24 | End: 2024-08-23

## 2024-07-18 ENCOUNTER — APPOINTMENT (OUTPATIENT)
Dept: CARDIOLOGY | Facility: CLINIC | Age: 49
End: 2024-07-18
Payer: COMMERCIAL

## 2024-07-26 ENCOUNTER — HOSPITAL ENCOUNTER (OUTPATIENT)
Dept: RADIOLOGY | Facility: CLINIC | Age: 49
Discharge: HOME | End: 2024-07-26
Payer: COMMERCIAL

## 2024-07-26 ENCOUNTER — LAB (OUTPATIENT)
Dept: LAB | Facility: LAB | Age: 49
End: 2024-07-26
Payer: COMMERCIAL

## 2024-07-26 DIAGNOSIS — R06.09 DOE (DYSPNEA ON EXERTION): ICD-10-CM

## 2024-07-26 DIAGNOSIS — Z77.011 LEAD EXPOSURE: ICD-10-CM

## 2024-07-26 DIAGNOSIS — R07.9 CHEST PAIN, UNSPECIFIED TYPE: ICD-10-CM

## 2024-07-26 DIAGNOSIS — R53.83 OTHER FATIGUE: ICD-10-CM

## 2024-07-26 PROCEDURE — 71046 X-RAY EXAM CHEST 2 VIEWS: CPT

## 2024-07-26 PROCEDURE — 71046 X-RAY EXAM CHEST 2 VIEWS: CPT | Performed by: RADIOLOGY

## 2024-07-26 PROCEDURE — 83655 ASSAY OF LEAD: CPT

## 2024-07-26 PROCEDURE — 36415 COLL VENOUS BLD VENIPUNCTURE: CPT

## 2024-07-26 PROCEDURE — 85379 FIBRIN DEGRADATION QUANT: CPT

## 2024-07-26 PROCEDURE — 85025 COMPLETE CBC W/AUTO DIFF WBC: CPT

## 2024-07-27 LAB
BASOPHILS # BLD AUTO: 0.05 X10*3/UL (ref 0–0.1)
BASOPHILS NFR BLD AUTO: 0.7 %
D DIMER PPP FEU-MCNC: 274 NG/ML FEU
EOSINOPHIL # BLD AUTO: 0.18 X10*3/UL (ref 0–0.7)
EOSINOPHIL NFR BLD AUTO: 2.5 %
ERYTHROCYTE [DISTWIDTH] IN BLOOD BY AUTOMATED COUNT: 13.2 % (ref 11.5–14.5)
HCT VFR BLD AUTO: 36.3 % (ref 36–46)
HGB BLD-MCNC: 10.9 G/DL (ref 12–16)
IMM GRANULOCYTES # BLD AUTO: 0.03 X10*3/UL (ref 0–0.7)
IMM GRANULOCYTES NFR BLD AUTO: 0.4 % (ref 0–0.9)
LYMPHOCYTES # BLD AUTO: 1.73 X10*3/UL (ref 1.2–4.8)
LYMPHOCYTES NFR BLD AUTO: 23.8 %
MCH RBC QN AUTO: 29.3 PG (ref 26–34)
MCHC RBC AUTO-ENTMCNC: 30 G/DL (ref 32–36)
MCV RBC AUTO: 98 FL (ref 80–100)
MONOCYTES # BLD AUTO: 0.69 X10*3/UL (ref 0.1–1)
MONOCYTES NFR BLD AUTO: 9.5 %
NEUTROPHILS # BLD AUTO: 4.58 X10*3/UL (ref 1.2–7.7)
NEUTROPHILS NFR BLD AUTO: 63.1 %
NRBC BLD-RTO: 0 /100 WBCS (ref 0–0)
PLATELET # BLD AUTO: 251 X10*3/UL (ref 150–450)
RBC # BLD AUTO: 3.72 X10*6/UL (ref 4–5.2)
WBC # BLD AUTO: 7.3 X10*3/UL (ref 4.4–11.3)

## 2024-07-29 ENCOUNTER — TELEPHONE (OUTPATIENT)
Dept: PRIMARY CARE | Facility: CLINIC | Age: 49
End: 2024-07-29
Payer: COMMERCIAL

## 2024-07-29 DIAGNOSIS — R53.83 OTHER FATIGUE: ICD-10-CM

## 2024-07-29 DIAGNOSIS — R07.9 CHEST PAIN, UNSPECIFIED TYPE: Primary | ICD-10-CM

## 2024-07-29 LAB — LEAD BLD-MCNC: <0.5 UG/DL

## 2024-07-29 NOTE — TELEPHONE ENCOUNTER
Lab called stating that troponin levels, ths w reflex and vit B12 was cx due to  insufficient sample. Please reorder if still needed.

## 2024-08-01 ENCOUNTER — APPOINTMENT (OUTPATIENT)
Dept: PRIMARY CARE | Facility: CLINIC | Age: 49
End: 2024-08-01
Payer: COMMERCIAL

## 2024-08-06 DIAGNOSIS — D64.9 ANEMIA, UNSPECIFIED TYPE: Primary | ICD-10-CM

## 2024-08-11 DIAGNOSIS — F32.A DEPRESSION, UNSPECIFIED DEPRESSION TYPE: ICD-10-CM

## 2024-08-12 RX ORDER — SERTRALINE HYDROCHLORIDE 50 MG/1
50 TABLET, FILM COATED ORAL DAILY
Qty: 30 TABLET | Refills: 1 | Status: SHIPPED | OUTPATIENT
Start: 2024-08-12

## 2024-08-14 ENCOUNTER — LAB (OUTPATIENT)
Dept: LAB | Facility: LAB | Age: 49
End: 2024-08-14
Payer: COMMERCIAL

## 2024-08-14 DIAGNOSIS — R53.83 OTHER FATIGUE: ICD-10-CM

## 2024-08-14 DIAGNOSIS — D64.9 ANEMIA, UNSPECIFIED TYPE: ICD-10-CM

## 2024-08-14 DIAGNOSIS — R07.9 CHEST PAIN, UNSPECIFIED TYPE: ICD-10-CM

## 2024-08-14 LAB
BASOPHILS # BLD AUTO: 0.03 X10*3/UL (ref 0–0.1)
BASOPHILS NFR BLD AUTO: 0.5 %
EOSINOPHIL # BLD AUTO: 0.12 X10*3/UL (ref 0–0.7)
EOSINOPHIL NFR BLD AUTO: 2.1 %
ERYTHROCYTE [DISTWIDTH] IN BLOOD BY AUTOMATED COUNT: 13.2 % (ref 11.5–14.5)
HCT VFR BLD AUTO: 39.3 % (ref 36–46)
HGB BLD-MCNC: 12.2 G/DL (ref 12–16)
HGB RETIC QN: 31 PG (ref 28–38)
IMM GRANULOCYTES # BLD AUTO: 0.02 X10*3/UL (ref 0–0.7)
IMM GRANULOCYTES NFR BLD AUTO: 0.4 % (ref 0–0.9)
IMMATURE RETIC FRACTION: 9.9 %
LYMPHOCYTES # BLD AUTO: 1.37 X10*3/UL (ref 1.2–4.8)
LYMPHOCYTES NFR BLD AUTO: 24.4 %
MCH RBC QN AUTO: 29 PG (ref 26–34)
MCHC RBC AUTO-ENTMCNC: 31 G/DL (ref 32–36)
MCV RBC AUTO: 94 FL (ref 80–100)
MONOCYTES # BLD AUTO: 0.75 X10*3/UL (ref 0.1–1)
MONOCYTES NFR BLD AUTO: 13.3 %
NEUTROPHILS # BLD AUTO: 3.33 X10*3/UL (ref 1.2–7.7)
NEUTROPHILS NFR BLD AUTO: 59.3 %
NRBC BLD-RTO: 0 /100 WBCS (ref 0–0)
PLATELET # BLD AUTO: 219 X10*3/UL (ref 150–450)
RBC # BLD AUTO: 4.2 X10*6/UL (ref 4–5.2)
RETICS #: 0.07 X10*6/UL (ref 0.02–0.08)
RETICS/RBC NFR AUTO: 1.6 % (ref 0.5–2)
WBC # BLD AUTO: 5.6 X10*3/UL (ref 4.4–11.3)

## 2024-08-14 PROCEDURE — 84484 ASSAY OF TROPONIN QUANT: CPT

## 2024-08-14 PROCEDURE — 85025 COMPLETE CBC W/AUTO DIFF WBC: CPT

## 2024-08-14 PROCEDURE — 82728 ASSAY OF FERRITIN: CPT

## 2024-08-14 PROCEDURE — 84443 ASSAY THYROID STIM HORMONE: CPT

## 2024-08-14 PROCEDURE — 85045 AUTOMATED RETICULOCYTE COUNT: CPT

## 2024-08-14 PROCEDURE — 36415 COLL VENOUS BLD VENIPUNCTURE: CPT

## 2024-08-14 PROCEDURE — 84165 PROTEIN E-PHORESIS SERUM: CPT

## 2024-08-14 PROCEDURE — 84155 ASSAY OF PROTEIN SERUM: CPT

## 2024-08-14 PROCEDURE — 83550 IRON BINDING TEST: CPT

## 2024-08-14 PROCEDURE — 82607 VITAMIN B-12: CPT

## 2024-08-14 PROCEDURE — 83540 ASSAY OF IRON: CPT

## 2024-08-15 LAB
ALBUMIN: 4 G/DL (ref 3.4–5)
ALPHA 1 GLOBULIN: 0.3 G/DL (ref 0.2–0.6)
ALPHA 2 GLOBULIN: 0.6 G/DL (ref 0.4–1.1)
BETA GLOBULIN: 0.8 G/DL (ref 0.5–1.2)
CARDIAC TROPONIN I PNL SERPL HS: <3 NG/L (ref 0–34)
FERRITIN SERPL-MCNC: 30 NG/ML (ref 8–150)
GAMMA GLOBULIN: 1.3 G/DL (ref 0.5–1.4)
IRON SATN MFR SERPL: 28 % (ref 25–45)
IRON SERPL-MCNC: 108 UG/DL (ref 35–150)
PATH REVIEW-SERUM PROTEIN ELECTROPHORESIS: NORMAL
PROT SERPL-MCNC: 6.9 G/DL (ref 6.4–8.2)
PROTEIN ELECTROPHORESIS COMMENT: NORMAL
TIBC SERPL-MCNC: 392 UG/DL (ref 240–445)
TSH SERPL-ACNC: 1.68 MIU/L (ref 0.44–3.98)
UIBC SERPL-MCNC: 284 UG/DL (ref 110–370)
VIT B12 SERPL-MCNC: 480 PG/ML (ref 211–911)

## 2024-10-09 DIAGNOSIS — F32.A DEPRESSION, UNSPECIFIED DEPRESSION TYPE: ICD-10-CM

## 2024-10-09 RX ORDER — SERTRALINE HYDROCHLORIDE 50 MG/1
50 TABLET, FILM COATED ORAL DAILY
Qty: 30 TABLET | Refills: 1 | Status: SHIPPED | OUTPATIENT
Start: 2024-10-09

## 2024-10-10 ENCOUNTER — HOSPITAL ENCOUNTER (OUTPATIENT)
Dept: CARDIOLOGY | Facility: CLINIC | Age: 49
Discharge: HOME | End: 2024-10-10
Payer: COMMERCIAL

## 2024-10-10 DIAGNOSIS — R06.09 DOE (DYSPNEA ON EXERTION): ICD-10-CM

## 2024-10-10 DIAGNOSIS — R07.9 CHEST PAIN, UNSPECIFIED TYPE: ICD-10-CM

## 2024-10-10 PROCEDURE — 93350 STRESS TTE ONLY: CPT | Performed by: INTERNAL MEDICINE

## 2024-10-10 PROCEDURE — 93016 CV STRESS TEST SUPVJ ONLY: CPT | Performed by: INTERNAL MEDICINE

## 2024-10-10 PROCEDURE — 93018 CV STRESS TEST I&R ONLY: CPT | Performed by: INTERNAL MEDICINE

## 2024-10-10 PROCEDURE — 93017 CV STRESS TEST TRACING ONLY: CPT

## 2024-10-15 ENCOUNTER — LAB (OUTPATIENT)
Dept: LAB | Facility: LAB | Age: 49
End: 2024-10-15

## 2024-10-15 DIAGNOSIS — E53.8 VITAMIN B12 DEFICIENCY: ICD-10-CM

## 2024-10-15 DIAGNOSIS — R94.39 ABNORMAL STRESS TEST: Primary | ICD-10-CM

## 2024-10-15 DIAGNOSIS — E55.9 VITAMIN D DEFICIENCY: ICD-10-CM

## 2024-10-15 PROCEDURE — 36415 COLL VENOUS BLD VENIPUNCTURE: CPT

## 2024-10-15 PROCEDURE — 82607 VITAMIN B-12: CPT

## 2024-10-15 PROCEDURE — 82306 VITAMIN D 25 HYDROXY: CPT

## 2024-10-15 ASSESSMENT — ENCOUNTER SYMPTOMS
HEADACHES: 1
RHINORRHEA: 1
SORE THROAT: 0
HEMOPTYSIS: 0
ORTHOPNEA: 0
FEVER: 0
SYNCOPE: 0
SPUTUM PRODUCTION: 1
LEG PAIN: 0
VOMITING: 0
SHORTNESS OF BREATH: 1
CLAUDICATION: 0
PND: 1
WHEEZING: 0
ABDOMINAL PAIN: 0
NECK PAIN: 0
SWOLLEN GLANDS: 0

## 2024-10-16 LAB
25(OH)D3 SERPL-MCNC: 36 NG/ML (ref 30–100)
VIT B12 SERPL-MCNC: 782 PG/ML (ref 211–911)

## 2024-10-17 ENCOUNTER — APPOINTMENT (OUTPATIENT)
Dept: PRIMARY CARE | Facility: CLINIC | Age: 49
End: 2024-10-17
Payer: COMMERCIAL

## 2024-10-17 VITALS
OXYGEN SATURATION: 98 % | BODY MASS INDEX: 27.55 KG/M2 | SYSTOLIC BLOOD PRESSURE: 124 MMHG | WEIGHT: 186 LBS | DIASTOLIC BLOOD PRESSURE: 74 MMHG | HEIGHT: 69 IN

## 2024-10-17 DIAGNOSIS — R76.8 SS-A ANTIBODY POSITIVE: ICD-10-CM

## 2024-10-17 DIAGNOSIS — K58.1 IRRITABLE BOWEL SYNDROME WITH CONSTIPATION: ICD-10-CM

## 2024-10-17 DIAGNOSIS — Z15.09 BRCA POSITIVE: ICD-10-CM

## 2024-10-17 DIAGNOSIS — L40.9 PSORIASIS: ICD-10-CM

## 2024-10-17 DIAGNOSIS — G43.009 MIGRAINE WITHOUT AURA AND WITHOUT STATUS MIGRAINOSUS, NOT INTRACTABLE: Primary | ICD-10-CM

## 2024-10-17 DIAGNOSIS — Z15.01 BRCA POSITIVE: ICD-10-CM

## 2024-10-17 DIAGNOSIS — F32.A DEPRESSION, UNSPECIFIED DEPRESSION TYPE: ICD-10-CM

## 2024-10-17 DIAGNOSIS — K59.09 CHRONIC CONSTIPATION: ICD-10-CM

## 2024-10-17 PROCEDURE — 3008F BODY MASS INDEX DOCD: CPT | Performed by: INTERNAL MEDICINE

## 2024-10-17 PROCEDURE — 1036F TOBACCO NON-USER: CPT | Performed by: INTERNAL MEDICINE

## 2024-10-17 PROCEDURE — 99213 OFFICE O/P EST LOW 20 MIN: CPT | Performed by: INTERNAL MEDICINE

## 2024-10-17 RX ORDER — SERTRALINE HYDROCHLORIDE 50 MG/1
50 TABLET, FILM COATED ORAL DAILY
Qty: 90 TABLET | Refills: 3 | Status: SHIPPED | OUTPATIENT
Start: 2024-10-17 | End: 2025-10-17

## 2024-10-17 ASSESSMENT — ENCOUNTER SYMPTOMS
ABDOMINAL PAIN: 0
SWOLLEN GLANDS: 0
ORTHOPNEA: 0
RHINORRHEA: 1
SHORTNESS OF BREATH: 1
SYNCOPE: 0
WHEEZING: 0
HEADACHES: 1
PND: 1
CLAUDICATION: 0
SORE THROAT: 0
FEVER: 0
LEG PAIN: 0
NECK PAIN: 0
VOMITING: 0
HEMOPTYSIS: 0
SPUTUM PRODUCTION: 1

## 2024-10-17 NOTE — PROGRESS NOTES
"Subjective   Patient ID: Cortney Kaufman is a 48 y.o. female who presents for Follow-up.    Shortness of Breath  This is a recurrent problem. The current episode started more than 1 month ago. The problem occurs intermittently. The problem has been gradually improving. The average episode lasts 5 minutes. Associated symptoms include chest pain, coryza, headaches, PND, rhinorrhea and sputum production. Pertinent negatives include no abdominal pain, claudication, ear pain, fever, hemoptysis, leg pain, leg swelling, neck pain, orthopnea, rash, sore throat, swollen glands, syncope, vomiting or wheezing. The symptoms are aggravated by exercise.   Fu rolando, atypical dep, cp/sob  Sxs a lot better on ssri (80%)  Stress echo w/ positive EKG portion, negative echo portion  Cp sob a lot better    Review of Systems   Constitutional:  Negative for fever.   HENT:  Positive for rhinorrhea. Negative for ear pain and sore throat.    Respiratory:  Positive for sputum production and shortness of breath. Negative for hemoptysis and wheezing.    Cardiovascular:  Positive for chest pain and PND. Negative for orthopnea, claudication, leg swelling and syncope.   Gastrointestinal:  Negative for abdominal pain and vomiting.   Musculoskeletal:  Negative for neck pain.   Skin:  Negative for rash.   Neurological:  Positive for headaches.       Objective   /74   Ht 1.753 m (5' 9\")   Wt 84.4 kg (186 lb)   SpO2 98%   BMI 27.47 kg/m²     Physical Exam  GEN nad, Affect wnl  Heent ncat, eomfg, face symmetric  Skin good color  Resp breathing easily  No p/m retardation or agitation  Thinking appropriate  Oriented    Assessment/Plan   Diagnoses and all orders for this visit:  Migraine without aura and without status migrainosus, not intractable  Depression, unspecified depression type  -     sertraline (Zoloft) 50 mg tablet; Take 1 tablet (50 mg) by mouth once daily.  Chronic constipation  SS-A antibody positive  Psoriasis  Irritable bowel " syndrome with constipation  BRCA positive     See cardio for reassurance  Continue ssri  Fu 6 mos

## 2024-10-18 PROBLEM — T75.3XXA MOTION SICKNESS: Status: ACTIVE | Noted: 2024-04-09

## 2024-11-07 ENCOUNTER — OFFICE VISIT (OUTPATIENT)
Dept: CARDIOLOGY | Facility: CLINIC | Age: 49
End: 2024-11-07
Payer: COMMERCIAL

## 2024-11-07 VITALS
BODY MASS INDEX: 28.06 KG/M2 | SYSTOLIC BLOOD PRESSURE: 123 MMHG | WEIGHT: 190 LBS | DIASTOLIC BLOOD PRESSURE: 74 MMHG | HEART RATE: 81 BPM | OXYGEN SATURATION: 98 %

## 2024-11-07 DIAGNOSIS — E78.6 LOW HDL (UNDER 40): ICD-10-CM

## 2024-11-07 DIAGNOSIS — R94.39 ABNORMAL STRESS TEST: Primary | ICD-10-CM

## 2024-11-07 PROCEDURE — 99204 OFFICE O/P NEW MOD 45 MIN: CPT | Performed by: STUDENT IN AN ORGANIZED HEALTH CARE EDUCATION/TRAINING PROGRAM

## 2024-11-07 PROCEDURE — 1036F TOBACCO NON-USER: CPT | Performed by: STUDENT IN AN ORGANIZED HEALTH CARE EDUCATION/TRAINING PROGRAM

## 2024-11-07 PROCEDURE — 99214 OFFICE O/P EST MOD 30 MIN: CPT | Performed by: STUDENT IN AN ORGANIZED HEALTH CARE EDUCATION/TRAINING PROGRAM

## 2024-11-07 ASSESSMENT — ENCOUNTER SYMPTOMS
EYES NEGATIVE: 1
SYNCOPE: 0
RESPIRATORY NEGATIVE: 1
CONSTITUTIONAL NEGATIVE: 1
GASTROINTESTINAL NEGATIVE: 1
ENDOCRINE NEGATIVE: 1
MUSCULOSKELETAL NEGATIVE: 1
PSYCHIATRIC NEGATIVE: 1
NEAR-SYNCOPE: 0
HEMATOLOGIC/LYMPHATIC NEGATIVE: 1
PALPITATIONS: 0
PND: 0
DYSPNEA ON EXERTION: 0
ORTHOPNEA: 0
ALLERGIC/IMMUNOLOGIC NEGATIVE: 1
NEUROLOGICAL NEGATIVE: 1

## 2024-11-07 NOTE — PATIENT INSTRUCTIONS
Thank you for your visit today. Please contact our office (via MyChart or phone) with any additional questions.     [facility] [place]    Ciera, RN/Clinic Nurse for:    Dr. Con Newton    [address], [address]  [place] [address]    Phone: [phone] Press Option 5 then Option 3 to speak with the Clinic Nurse (Ciera)    _____    To Reach:    Billing Questions -    [phone]  Scheduling / Rescheduling -  Option 1  Refills / Medication Requests -  Option 3  General Office / [place] -  Option 4  Results -     Option 6  Medical Records -    Option 7  Repeat Options -    Option 9

## 2024-11-07 NOTE — PROGRESS NOTES
Cardiology New Patient History and Physical    Reason for referral: abnormal stress ECG    HPI: Cortney Kaufman is a 48 y.o.  female who presents today for abnormal stress ECG. Past medical history of anxiety, low HDL, hx irritable bowel syndrome.    Cortney was previously seen by her primary care physician for dyspnea and chest pain that were worse with exertion.  Patient underwent further evaluation with exercise stress echo.  Adequate level of stress was achieved, during recovery, there was 1.0 mm of downsloping ST segment depression in inferior-lateral leads. ECG changes resolved after 5 minutes.  Stress echo imaging showed no regional wall motion abnormalities; normal stress echo.  Patient referred to establish care with cardiology.    Cortney presented to cardiology clinic on 11/7/2024.  Currently asymptomatic from a cardiovascular perspective.  Exertional dyspnea and chest pain have resolved.  Of note patient was previously initiated on antianxiety medication, after which patient's symptoms have greatly improved.  Patient's cardiac risk factors include low HDL, family history of cardiovascular disease in her maternal and paternal grandfather.  Patient is a non-smoker.      Past Medical History:    - as above    Surgical History:   She has a past surgical history that includes Other surgical history (01/07/2020); Other surgical history (01/07/2020); Other surgical history (01/07/2020); and [place] tooth extraction.    Family History:   Family History   Problem Relation Name Age of Onset    Hypertension Mother Cherie Joanna Lomeli     Arthritis Father Bhavin Yani     Cancer Father Bhavin Yani     Depression Father Bhavin Yani     Hypertension Father Bhavin Yani     Heart disease Maternal Grandfather Reginaldo Jackson     Stroke Maternal Grandfather Reginaldo Jackson     Cancer Maternal Grandmother Summer Jackson     Ovarian cancer Maternal Grandmother Summer Jackson     Arthritis  Paternal Grandfather Deon Lomeli     Cancer Paternal Grandfather Deon Lomeli     Heart disease Paternal Grandfather Deon Lomeli     Hypertension Paternal Grandfather Deon Lomeli     Hearing loss Paternal Grandmother Nga Lomeli     Cancer Father's Sister Ginny Bose     Cancer Father's Brother Matteo Lomeli      Allergies:  Penicillins and Latex     Social History:   - Non-smoker; no illicit drug use    Prior Cardiovascular Testing (personally reviewed):     Exercise stress echo (10/11/2024)   1. The resting ejection fraction was estimated at 55 to 60% with a peak exercise ejection fraction estimated at 65 to 70%.   2. Positive stress EKG for ischemia.   3. Average functional capacity for age.   4. No exercise associated cardiac symptoms were noted.   5. Normal resting and post-rest echocardiographic imaging was seen.   6. Overall normal stress echo.    ECG (6/19/2024)- sinus rhythm    Review of Systems:  Review of Systems   Constitutional: Negative.   HENT: Negative.     Eyes: Negative.    Cardiovascular:  Negative for chest pain, dyspnea on exertion, near-syncope, orthopnea, palpitations, paroxysmal nocturnal dyspnea and syncope.   Respiratory: Negative.     Endocrine: Negative.    Hematologic/Lymphatic: Negative.    Skin: Negative.    Musculoskeletal: Negative.    Gastrointestinal: Negative.    Genitourinary: Negative.    Neurological: Negative.    Psychiatric/Behavioral: Negative.     Allergic/Immunologic: Negative.        Objective     Outpatient Medications:    Current Outpatient Medications:     BACILLUS COAGULANS-INULIN ORAL, Take by mouth., Disp: , Rfl:     cholecalciferol (Vitamin D-3) 25 MCG (1000 UT) tablet, Take by mouth once daily. 3000 once daily, Disp: , Rfl:     cranberry 400 mg capsule, Take by mouth., Disp: , Rfl:     cyanocobalamin (Vitamin B-12) 1,000 mcg tablet, Take 1 tablet (1,000 mcg) by mouth once daily., Disp: , Rfl:     docosahexaenoic acid 200 mg capsule, Take by  mouth., Disp: , Rfl:     ibuprofen 600 mg tablet, Take 1 tablet (600 mg) by mouth every 6 hours if needed for mild pain (1 - 3)., Disp: , Rfl:     linaCLOtide (Linzess) 290 mcg capsule, Take 1 capsule (290 mcg) by mouth once daily in the morning. Take before meals. Do not crush or chew., Disp: 90 capsule, Rfl: 3    lysine 1,000 mg tablet, Take by mouth. 1 daily, Disp: , Rfl:     multivitamin (Multiple Vitamins) tablet, Take by mouth., Disp: , Rfl:     ondansetron (Zofran) 4 mg tablet, Take 1 tablet (4 mg) by mouth every 8 hours if needed for nausea or vomiting., Disp: , Rfl:     sertraline (Zoloft) 50 mg tablet, Take 1 tablet (50 mg) by mouth once daily., Disp: 90 tablet, Rfl: 3    Ubrelvy 100 mg tablet tablet, TAKE 1 TABLET BY MOUTH MAY TAKE 2ND DOSE AT LEAST 2 HRS AFTER AS NEEDED ONCE DAILY, Disp: , Rfl:      Last Recorded Vitals  /74 (BP Location: Right arm, Patient Position: Sitting)   Pulse 81   Wt 86.2 kg (190 lb)   SpO2 98%   BMI 28.06 kg/m²     Physical Exam:  Physical Exam  Constitutional:       General: She is not in acute distress.  HENT:      Head: Normocephalic.      Mouth/Throat:      Mouth: Mucous membranes are moist.   Eyes:      Extraocular Movements: Extraocular movements intact.      Conjunctiva/sclera: Conjunctivae normal.   Neck:      Vascular: No carotid bruit or JVD.   Cardiovascular:      Rate and Rhythm: Normal rate and regular rhythm.      Pulses: Normal pulses.      Heart sounds: No murmur heard.  Pulmonary:      Effort: Pulmonary effort is normal. No respiratory distress.      Breath sounds: Normal breath sounds.   Abdominal:      General: Bowel sounds are normal. There is no distension.      Palpations: Abdomen is soft.   Musculoskeletal:         General: No swelling.   Skin:     General: Skin is warm and dry.   Neurological:      General: No focal deficit present.      Mental Status: She is alert.      Cranial Nerves: No cranial nerve deficit.      Motor: No weakness.    Psychiatric:         Mood and Affect: Mood normal.         Behavior: Behavior normal.         Lab Review:    Lab Results   Component Value Date    GLUCOSE 81 12/13/2023    CALCIUM 9.1 12/13/2023     12/13/2023    K 4.5 12/13/2023    CO2 25 12/13/2023     (H) 12/13/2023    BUN 11 12/13/2023    CREATININE 0.82 12/13/2023       Lab Results   Component Value Date    WBC 5.6 08/14/2024    HGB 12.2 08/14/2024    HCT 39.3 08/14/2024    MCV 94 08/14/2024     08/14/2024       Lab Results   Component Value Date    CHOL 150 12/13/2023    CHOL 134 09/29/2022    CHOL 170 08/10/2021     Lab Results   Component Value Date    HDL 34.3 12/13/2023    HDL 42.1 09/29/2022    HDL 48.3 08/10/2021     Lab Results   Component Value Date    LDLCALC 102 (H) 12/13/2023     Lab Results   Component Value Date    TRIG 68 12/13/2023    TRIG 78 09/29/2022    TRIG 62 08/10/2021     Lab Results   Component Value Date    TSH 1.68 08/14/2024       Assessment:   48 y.o.  female who presents today for abnormal stress ECG. Past medical history of anxiety, low HDL, hx irritable bowel syndrome.    Patient is currently asymptomatic from a cardiovascular perspective.  Prior stress ECG showed dynamic changes during recovery that were suggestive of ischemia.  However, stress echo was normal without any evidence of inducible wall motion abnormalities.  Discussed option of further evaluation with coronary CTA versus exercise nuclear medicine stress testing.  As patient's symptoms have improved, patient prefers to take a watchful waiting approach.    Overall Plan:  1.  Exertional dyspnea, chest pain (resolved)  - Exercise stress ECG had dynamic ST segment depressions that were suggestive of ischemia; however exercise stress echocardiography was normal  - Discussed option of further restratification with coronary CTA; patient would like to defer for now as her symptoms have resolved but is willing to reconsider if symptoms  "return    2.  Dyslipidemia  - History of low HDL  - Continue dietary and lifestyle modifications; encouraged heart healthy/[place] style diet and regular physical activity    3. Discussed \"red flag\" symptoms that should prompt immediate medical attention; patient verbalized understanding    Disposition: Return to cardiology clinic in July 2024    Magnus Andujar MD        "

## 2024-11-25 ENCOUNTER — TELEPHONE (OUTPATIENT)
Dept: GASTROENTEROLOGY | Facility: CLINIC | Age: 49
End: 2024-11-25
Payer: COMMERCIAL

## 2024-11-25 DIAGNOSIS — R10.9 ABDOMINAL PAIN, UNSPECIFIED ABDOMINAL LOCATION: ICD-10-CM

## 2024-11-25 DIAGNOSIS — K58.1 IRRITABLE BOWEL SYNDROME WITH CONSTIPATION: Primary | ICD-10-CM

## 2024-11-25 DIAGNOSIS — K58.1 IRRITABLE BOWEL SYNDROME WITH CONSTIPATION: ICD-10-CM

## 2024-11-25 RX ORDER — HYOSCYAMINE SULFATE 0.38 MG/1
0.38 TABLET, EXTENDED RELEASE ORAL EVERY 12 HOURS PRN
Qty: 120 TABLET | Refills: 0 | Status: SHIPPED | OUTPATIENT
Start: 2024-11-25 | End: 2024-11-25 | Stop reason: SDUPTHER

## 2024-11-25 RX ORDER — HYOSCYAMINE SULFATE 0.38 MG/1
0.38 TABLET, EXTENDED RELEASE ORAL EVERY 12 HOURS PRN
Qty: 120 TABLET | Refills: 0 | Status: SHIPPED | OUTPATIENT
Start: 2024-11-25 | End: 2025-01-24

## 2024-11-25 NOTE — TELEPHONE ENCOUNTER
Cortney left a message this morning asking what she can take for the horrible stomach pain she is in, she has been taking the linzess every morning, but she is experiencing really bad gas pains and stomach pains, she said she tried taking metamucil and that didn't help, she has been drinking peppermint tea, and yesterday she said she tool ibuprofen and she said that didn't really help much. She is scheduled for office visit  on Monday 12/2 she is wanting to know what she can do about the pain she is in.

## 2024-11-29 ENCOUNTER — PATIENT OUTREACH (OUTPATIENT)
Dept: CARE COORDINATION | Facility: CLINIC | Age: 49
End: 2024-11-29
Payer: COMMERCIAL

## 2024-11-29 NOTE — PROGRESS NOTES
Outreach call to patient to support a smooth transition of care from recent admission.  Left voicemail message for patient with my contact information.    Amy PEREZ, RN, [facility]  O: [phone]

## 2024-12-02 ENCOUNTER — APPOINTMENT (OUTPATIENT)
Dept: GASTROENTEROLOGY | Facility: CLINIC | Age: 49
End: 2024-12-02
Payer: COMMERCIAL

## 2024-12-13 ENCOUNTER — PATIENT OUTREACH (OUTPATIENT)
Dept: CARE COORDINATION | Facility: CLINIC | Age: 49
End: 2024-12-13
Payer: COMMERCIAL

## 2024-12-13 NOTE — PROGRESS NOTES
Attempted outreach call to patient following up on an appointment with the care provider.  Left a voice message with my contact information.   Will continue to follow.        Amy PEREZ, RN, [facility]  O: [phone]

## 2024-12-17 ENCOUNTER — PATIENT OUTREACH (OUTPATIENT)
Dept: CARE COORDINATION | Facility: CLINIC | Age: 49
End: 2024-12-17
Payer: COMMERCIAL

## 2024-12-17 NOTE — PROGRESS NOTES
Outreach call to patient to support a smooth transition of care from recent admission.  Left voicemail message for patient with my contact information.    Amy PEREZ, RN, [facility]  O: [phone]

## 2024-12-20 ENCOUNTER — TELEPHONE (OUTPATIENT)
Dept: GASTROENTEROLOGY | Facility: CLINIC | Age: 49
End: 2024-12-20
Payer: COMMERCIAL

## 2024-12-20 NOTE — TELEPHONE ENCOUNTER
Pt called she has some questions about the linzess, after she had the colon surgery she was told to stop the linzess, she has been taking over the counter colace 2-3 times per day, she is having regular bowel movements and she is slowly adding fiber back into her diet and so far she is doing ok with that. But she is wanting to know if that is good for her continue that way or if she should add the linzess back in at any point? She is scheduled for a follow up with you on February.

## 2024-12-23 DIAGNOSIS — N39.0 URINARY TRACT INFECTION WITHOUT HEMATURIA, SITE UNSPECIFIED: Primary | ICD-10-CM

## 2024-12-23 RX ORDER — NITROFURANTOIN 25; 75 MG/1; MG/1
100 CAPSULE ORAL 2 TIMES DAILY
Qty: 10 CAPSULE | Refills: 0 | Status: SHIPPED | OUTPATIENT
Start: 2024-12-23 | End: 2024-12-28

## 2025-01-10 ENCOUNTER — PATIENT OUTREACH (OUTPATIENT)
Dept: CARE COORDINATION | Facility: CLINIC | Age: 50
End: 2025-01-10
Payer: COMMERCIAL

## 2025-01-10 NOTE — PROGRESS NOTES
Attempted outreach call to patient to check in 30 days after hospital discharge to support smooth transition of care.  Left a voice message with my contact information.  No additional outreach needed at this time.    jorge PEREZ, RN, [facility]  O: [phone]

## 2025-01-13 ENCOUNTER — TELEPHONE (OUTPATIENT)
Dept: GASTROENTEROLOGY | Facility: CLINIC | Age: 50
End: 2025-01-13
Payer: COMMERCIAL

## 2025-01-13 NOTE — TELEPHONE ENCOUNTER
Pt called left a long voicemail on our line, she had a follow up with her surgeon because she is having really bad stomach pains, after her surgery, she had a CT done last week, they said she was vary constipated, she is going to increase the metamucil and move to a low fiber diet, surgeon said there is nothing  wrong from their standpoint, and maybe it's IBS and you will likely want to do a colonoscopy. So she is asking if she should get a sooner appt then February that she is already scheduled for?

## 2025-01-15 RX ORDER — FLUCONAZOLE 150 MG/1
TABLET ORAL
COMMUNITY
Start: 2025-01-06

## 2025-01-15 RX ORDER — DOCUSATE SODIUM 50 MG/5ML
LIQUID ORAL
COMMUNITY

## 2025-01-15 RX ORDER — ERYTHROMYCIN 5 MG/G
OINTMENT OPHTHALMIC
COMMUNITY
Start: 2024-08-20

## 2025-01-15 RX ORDER — OXYCODONE HYDROCHLORIDE 5 MG/1
TABLET ORAL
COMMUNITY
Start: 2024-11-30

## 2025-01-15 RX ORDER — CYCLOSPORINE 0.5 MG/ML
1 EMULSION OPHTHALMIC 2 TIMES DAILY
COMMUNITY

## 2025-01-15 RX ORDER — DOXYCYCLINE 100 MG/1
1 CAPSULE ORAL
COMMUNITY
Start: 2024-12-12

## 2025-01-16 ENCOUNTER — OFFICE VISIT (OUTPATIENT)
Dept: GASTROENTEROLOGY | Facility: CLINIC | Age: 50
End: 2025-01-16
Payer: COMMERCIAL

## 2025-01-16 VITALS — HEART RATE: 87 BPM | BODY MASS INDEX: 26.48 KG/M2 | WEIGHT: 185 LBS | HEIGHT: 70 IN

## 2025-01-16 DIAGNOSIS — R14.0 ABDOMINAL BLOATING: ICD-10-CM

## 2025-01-16 DIAGNOSIS — R10.9 ABDOMINAL PAIN, UNSPECIFIED ABDOMINAL LOCATION: Primary | ICD-10-CM

## 2025-01-16 DIAGNOSIS — K59.04 CHRONIC IDIOPATHIC CONSTIPATION: ICD-10-CM

## 2025-01-16 PROCEDURE — 3008F BODY MASS INDEX DOCD: CPT | Performed by: INTERNAL MEDICINE

## 2025-01-16 PROCEDURE — 1036F TOBACCO NON-USER: CPT | Performed by: INTERNAL MEDICINE

## 2025-01-16 PROCEDURE — 99214 OFFICE O/P EST MOD 30 MIN: CPT | Performed by: INTERNAL MEDICINE

## 2025-01-16 NOTE — PROGRESS NOTES
Subjective     History of Present Illness:     48yo with h/o BRCA +, IBS, psoriasis, trigeminal neuralgia, chronic constipation here for follow up, last seen 6 mo ago at that time doing well on linzess 290 .  Prior to taking was c/o back, abd pain , irregular bowel movements.   Since last visit admitted end of Nov with abdominal pain, n/v, CT showing sigmoid volvulus, s/p decompression colonoscopy, RT placement, then underwent sigmoidectomy on 11/27. Findings- redundant sigmoid, resected with side to side antiperistaltic stapled anastomosis, pexy of redundant mobile cecum to right lower quadrant abdominal wall.    Since surgery has been off high fiber diet. Did not take laxatives or fiber after surgery for few weeks, has since resumed.  Intermittent abdominal pains, mostly at night and with positional changes, sharp pains. Worries could be recurrent volvulus. During day mild discomfort.  Bms are daily but still feels incomplete , some bloating not as before.  No rectal bleeding.  Prior to volvulus was doing well on linzess 290.    KUB 1/6/25 - moderate diffuse colonic stool      Egd 3/8/24- normal esophagus, fundic gland polyps, normal duodenum. Bx gastric and duodenum normal.  Colonoscopy 3/8/24- redundant and tortuous with significant looping; dilated lumen, internal hemorrhoids, normal TI; otherwise normal.    Allergies  Allergies   Allergen Reactions    Penicillins Other, Rash and Swelling     Swollen feet       Swelling on feet    Swelling on feet    Latex Itching, Other and Rash     Noticed irritation on mouth/cold sore after going to dentist        Noticed irritation on mouth/cold sore after going to dentist    Irritated skin     Irritated skin       Medications  Current Outpatient Medications   Medication Instructions    BACILLUS COAGULANS-INULIN ORAL Take by mouth.    cholecalciferol (Vitamin D-3) 25 MCG (1000 UT) tablet Daily    cranberry 400 mg capsule Take by mouth.    cyanocobalamin (VITAMIN B-12) 1,000  mcg, Daily    cycloSPORINE (Restasis) 0.05 % ophthalmic emulsion 1 drop, ophthalmic (eye), 2 times daily    docosahexaenoic acid 200 mg capsule Take by mouth.    docusate sodium (Colace) 50 mg/5 mL oral liquid oral    doxycycline (Vibramycin) 100 mg capsule 1 capsule, Every 12 hours scheduled (0630,1830)    erythromycin (Romycin) 5 mg/gram (0.5 %) ophthalmic ointment APPLY 1/2 INCH STRIP OINTMENT TO AFFECTED EYE(S) TWO TIMES A DAY.    fluconazole (Diflucan) 150 mg tablet Take 1 tablet now for yeast. Can repeat in 4 days of yeast symptoms persist/recur.    hyoscyamine ER (LEVBID) 0.375 mg, oral, Every 12 hours PRN, Do not crush or chew.    ibuprofen 600 mg, Every 6 hours PRN    linaCLOtide (LINZESS) 290 mcg, oral, Daily before breakfast, Do not crush or chew.    lysine 1,000 mg tablet Take by mouth. 1 daily    multivitamin (Multiple Vitamins) tablet Take by mouth.    ondansetron (ZOFRAN) 4 mg, Every 8 hours PRN    oxyCODONE (Roxicodone) 5 mg immediate release tablet TAKE 1 TABLET BY MOUTH EVERY 6 HOURS AS NEEDED FOR UP TO 3 DAYS.    psyllium husk 3.4 gram/5.4 gram powder oral    sertraline (ZOLOFT) 50 mg, oral, Daily    Ubrelvy 100 mg tablet tablet TAKE 1 TABLET BY MOUTH MAY TAKE 2ND DOSE AT LEAST 2 HRS AFTER AS NEEDED ONCE DAILY        Objective   There were no vitals taken for this visit.   Physical Exam  Vitals reviewed.   Constitutional:       General: She is awake.   Cardiovascular:      Rate and Rhythm: Normal rate and regular rhythm.   Pulmonary:      Effort: Pulmonary effort is normal. No respiratory distress.      Breath sounds: Normal breath sounds. No wheezing.   Abdominal:      General: Bowel sounds are normal. There is no distension.      Palpations: Abdomen is soft. There is no mass.      Tenderness: There is no abdominal tenderness.   Neurological:      Mental Status: She is alert and oriented to person, place, and time.   Psychiatric:         Attention and Perception: Attention and perception normal.          Behavior: Behavior normal.               Assessment/Plan:      50yo with h/o BRCA +, IBS, psoriasis, trigeminal neuralgia, chronic constipation here for follow up after admission for sigmoid volvulus, s/p sigmoid colectomy and pexy of cecum to RLQ wall. Pt with intermittent abdoimnal pains with moderate stool on KUB, also likely with pain from post op healing. No alarm sx. Will obtain CT a/p .     Rec  CT a/p  Increase dietary fiber  Clean out with mag citrate  Then resume metamucil bid and linzess 290    Follow up in 3 mo    Ami Olsen MD

## 2025-01-21 DIAGNOSIS — R10.9 ABDOMINAL PAIN, UNSPECIFIED ABDOMINAL LOCATION: ICD-10-CM

## 2025-01-21 DIAGNOSIS — K58.1 IRRITABLE BOWEL SYNDROME WITH CONSTIPATION: ICD-10-CM

## 2025-01-21 RX ORDER — HYOSCYAMINE SULFATE 0.38 MG/1
0.38 TABLET, EXTENDED RELEASE ORAL EVERY 12 HOURS PRN
Qty: 120 TABLET | Refills: 0 | Status: SHIPPED | OUTPATIENT
Start: 2025-01-21 | End: 2025-03-22

## 2025-01-28 DIAGNOSIS — R10.9 ABDOMINAL PAIN, UNSPECIFIED ABDOMINAL LOCATION: Primary | ICD-10-CM

## 2025-01-29 ENCOUNTER — HOSPITAL ENCOUNTER (OUTPATIENT)
Dept: RADIOLOGY | Facility: CLINIC | Age: 50
Discharge: HOME | End: 2025-01-29
Payer: COMMERCIAL

## 2025-01-29 DIAGNOSIS — R10.9 ABDOMINAL PAIN, UNSPECIFIED ABDOMINAL LOCATION: ICD-10-CM

## 2025-01-29 LAB
CREAT SERPL-MCNC: 0.8 MG/DL (ref 0.6–1.3)
GFR SERPL CREATININE-BSD FRML MDRD: >90 ML/MIN/1.73M*2

## 2025-01-29 PROCEDURE — 74177 CT ABD & PELVIS W/CONTRAST: CPT | Performed by: RADIOLOGY

## 2025-01-29 PROCEDURE — 2550000001 HC RX 255 CONTRASTS: Performed by: INTERNAL MEDICINE

## 2025-01-29 PROCEDURE — 82565 ASSAY OF CREATININE: CPT

## 2025-01-29 PROCEDURE — 74177 CT ABD & PELVIS W/CONTRAST: CPT

## 2025-01-29 RX ADMIN — IOHEXOL 75 ML: 350 INJECTION, SOLUTION INTRAVENOUS at 09:38

## 2025-02-14 ENCOUNTER — TELEPHONE (OUTPATIENT)
Dept: GASTROENTEROLOGY | Facility: CLINIC | Age: 50
End: 2025-02-14
Payer: COMMERCIAL

## 2025-02-14 NOTE — TELEPHONE ENCOUNTER
Pt called she was in ED yesterday afternoon she has lots of questions, but wants to address the current issues first, she stated that she was told she has colitis, they put her on antibiotics, and Zofran, she is not able to eat or drink much at all and is complaining about the pain. They told her it also may be the neurovirus that is going around, so she didn't know if she should still take the antibiotics? Also she wanted to know if she should be still taking the linzess, she didn't take it today because of how she is feeling and didn't want to induce herself to keep going to the bathroom after how much she went yesterday. She wants to know what to do...

## 2025-02-19 ENCOUNTER — APPOINTMENT (OUTPATIENT)
Dept: GASTROENTEROLOGY | Facility: CLINIC | Age: 50
End: 2025-02-19
Payer: COMMERCIAL

## 2025-03-10 DIAGNOSIS — K21.9 GASTROESOPHAGEAL REFLUX DISEASE WITHOUT ESOPHAGITIS: Primary | ICD-10-CM

## 2025-03-10 RX ORDER — OMEPRAZOLE 40 MG/1
40 CAPSULE, DELAYED RELEASE ORAL
Qty: 90 CAPSULE | Refills: 3 | Status: SHIPPED | OUTPATIENT
Start: 2025-03-10 | End: 2026-03-10

## 2025-04-16 ENCOUNTER — APPOINTMENT (OUTPATIENT)
Dept: GASTROENTEROLOGY | Facility: CLINIC | Age: 50
End: 2025-04-16
Payer: COMMERCIAL

## 2025-04-16 VITALS — BODY MASS INDEX: 27.77 KG/M2 | HEIGHT: 70 IN | WEIGHT: 194 LBS | HEART RATE: 89 BPM

## 2025-04-16 DIAGNOSIS — K59.04 CHRONIC IDIOPATHIC CONSTIPATION: Primary | ICD-10-CM

## 2025-04-16 PROCEDURE — 1036F TOBACCO NON-USER: CPT | Performed by: INTERNAL MEDICINE

## 2025-04-16 PROCEDURE — 99214 OFFICE O/P EST MOD 30 MIN: CPT | Performed by: INTERNAL MEDICINE

## 2025-04-16 PROCEDURE — 3008F BODY MASS INDEX DOCD: CPT | Performed by: INTERNAL MEDICINE

## 2025-04-16 NOTE — PROGRESS NOTES
Subjective     History of Present Illness:     48yo with h/o BRCA +, IBS, psoriasis, trigeminal neuralgia, chronic constipation here for follow up. Last seen 3 mo ago after with previous recent admission in Nov with abd pain, n/v, CT showing sigmoid volvulus, s/p decompression colonoscopy, RT placement, s/p sigmoidectomy 11/27/24. Findings- redundant sigmoid, resected with side to side antiperistaltic stapled anastomosis, pexy of redundant mobile cecum to right lower quadrant abdominal wall.    Last visit, intermittent abd pain, more at night, with positional changes, daily Bm, incomplete evacuation, no bleeding. Recommended clean out then resume metamucil bid, linzess 290. CT a/p 1/16 mixed solid and liquid stool in right colon, solid stool in transverse, left and sigmoid, no acute inflammatory change.  Presented to ED 2/13 with b/l abd pain, nausea and frequent bowel movements.Labs wnl, CT -mild diffuse colonic wall thickening, favored infectious colitis. Given cipro/flagyl on discharge.    Since last visit mag citrate one bottle felt sick not fully emptied but didn't take more.  Current on linzess 290 daily, psyllium husk 1 tbsp once a day. Bms have been daily.  Still not feeling totally emptying but much improved from prior.  Rare abd discomfort at times due to not taking fiber supplement due to running out.  Also taking omeprazole 40 mg daily for reflux.  No breakthrough sx, would like to try off PPI  No blood per rectum.      Egd 3/8/24- normal esophagus, fundic gland polyps, normal duodenum. Bx gastric and duodenum normal.  Colonoscopy 3/8/24- redundant and tortuous with significant looping; dilated lumen, internal hemorrhoids, normal TI; otherwise normal.    Allergies  RX Allergies[1]    Medications  Current Outpatient Medications   Medication Instructions    BACILLUS COAGULANS-INULIN ORAL Take by mouth.    cholecalciferol (Vitamin D-3) 25 MCG (1000 UT) tablet Daily    cranberry 400 mg capsule Take by mouth.     cyanocobalamin (VITAMIN B-12) 1,000 mcg, Daily    cycloSPORINE (Restasis) 0.05 % ophthalmic emulsion 1 drop, 2 times daily    docosahexaenoic acid 200 mg capsule Take by mouth.    docusate sodium (Colace) 50 mg/5 mL oral liquid Take by mouth.    doxycycline (Vibramycin) 100 mg capsule 1 capsule, Every 12 hours scheduled (0630,1830)    erythromycin (Romycin) 5 mg/gram (0.5 %) ophthalmic ointment APPLY 1/2 INCH STRIP OINTMENT TO AFFECTED EYE(S) TWO TIMES A DAY.    fluconazole (Diflucan) 150 mg tablet Take 1 tablet now for yeast. Can repeat in 4 days of yeast symptoms persist/recur.    hyoscyamine ER (LEVBID) 0.375 mg, oral, Every 12 hours PRN, Do not crush or chew.    ibuprofen 600 mg, Every 6 hours PRN    linaCLOtide (LINZESS) 290 mcg, oral, Daily before breakfast, Do not crush or chew.    lysine 1,000 mg tablet Take by mouth. 1 daily    multivitamin (Multiple Vitamins) tablet Take by mouth.    omeprazole (PRILOSEC) 40 mg, oral, Daily before breakfast, Do not crush or chew.    ondansetron (ZOFRAN) 4 mg, Every 8 hours PRN    oxyCODONE (Roxicodone) 5 mg immediate release tablet TAKE 1 TABLET BY MOUTH EVERY 6 HOURS AS NEEDED FOR UP TO 3 DAYS.    promethazine (Phenergan) 12.5 mg tablet Take one to two tablets every 6 hours as needed for nausea/vomiting.    psyllium husk 3.4 gram/5.4 gram powder Take by mouth.    sertraline (ZOLOFT) 50 mg, oral, Daily    Ubrelvy 100 mg tablet tablet TAKE 1 TABLET BY MOUTH MAY TAKE 2ND DOSE AT LEAST 2 HRS AFTER AS NEEDED ONCE DAILY        Objective   There were no vitals taken for this visit.   Physical Exam          Assessment/Plan:    50yo with h/o BRCA +, IBS, psoriasis, trigeminal neuralgia, chronic constipation here for follow up after admission for sigmoid volvulus, s/p sigmoid colectomy and pexy of cecum to RLQ wall. Doing well now on linzess 290 and fiber daily.  Still feels incomplete evacuation.  Suspect may have defecatory dysfunction or prolapse; notes similar urinary  symtoms. Will try increasing fiber and adding stimulant laxative, if ineffective, get ARM.    Rec  Add second dose of fiber .  If still incomplete trial of adding dulcolax.  If no improvement, will get ARM.  Ok to try stopping ppi, would taper dose.        Ami Olsen MD              [1]   Allergies  Allergen Reactions    Penicillins Other, Rash and Swelling     Swollen feet       Swelling on feet    Swelling on feet    Latex Itching, Other and Rash     Noticed irritation on mouth/cold sore after going to dentist        Noticed irritation on mouth/cold sore after going to dentist    Irritated skin     Irritated skin

## 2025-04-17 ENCOUNTER — APPOINTMENT (OUTPATIENT)
Dept: PRIMARY CARE | Facility: CLINIC | Age: 50
End: 2025-04-17

## 2025-04-24 ENCOUNTER — APPOINTMENT (OUTPATIENT)
Dept: PRIMARY CARE | Facility: CLINIC | Age: 50
End: 2025-04-24
Payer: COMMERCIAL

## 2025-04-24 VITALS
HEIGHT: 70 IN | WEIGHT: 192.4 LBS | BODY MASS INDEX: 27.54 KG/M2 | OXYGEN SATURATION: 97 % | DIASTOLIC BLOOD PRESSURE: 68 MMHG | HEART RATE: 84 BPM | SYSTOLIC BLOOD PRESSURE: 120 MMHG

## 2025-04-24 DIAGNOSIS — E55.9 VITAMIN D DEFICIENCY: ICD-10-CM

## 2025-04-24 DIAGNOSIS — L40.9 PSORIASIS: ICD-10-CM

## 2025-04-24 DIAGNOSIS — K58.1 IRRITABLE BOWEL SYNDROME WITH CONSTIPATION: ICD-10-CM

## 2025-04-24 DIAGNOSIS — G43.809 OTHER MIGRAINE WITHOUT STATUS MIGRAINOSUS, NOT INTRACTABLE: ICD-10-CM

## 2025-04-24 DIAGNOSIS — F32.A DEPRESSION, UNSPECIFIED DEPRESSION TYPE: ICD-10-CM

## 2025-04-24 DIAGNOSIS — Z00.00 WELLNESS EXAMINATION: ICD-10-CM

## 2025-04-24 DIAGNOSIS — R76.8 SS-A ANTIBODY POSITIVE: ICD-10-CM

## 2025-04-24 DIAGNOSIS — E53.8 B12 DEFICIENCY: Primary | ICD-10-CM

## 2025-04-24 PROBLEM — Z90.49 S/P LAPAROSCOPIC-ASSISTED SIGMOIDECTOMY: Status: ACTIVE | Noted: 2024-11-29

## 2025-04-24 PROBLEM — Z98.890 S/P EXPLORATORY LAPAROTOMY: Status: ACTIVE | Noted: 2024-11-29

## 2025-04-24 PROCEDURE — 3008F BODY MASS INDEX DOCD: CPT | Performed by: INTERNAL MEDICINE

## 2025-04-24 PROCEDURE — 99396 PREV VISIT EST AGE 40-64: CPT | Performed by: INTERNAL MEDICINE

## 2025-04-24 NOTE — PROGRESS NOTES
"Subjective   Patient ID: Cortney Kaufman is a 49 y.o. female who presents for Annual Exam (yearly).    HPI better w/ ssri. Less anxious. Less somatic sxs.  Seeing neuro. On ubrelvy. Migraines doing ok.   Gerd and on short term ppi  Motion sickness w/ car rides  Chest sxs much better w/ ssri  Fu IBS, psoriasis, dep/anx, brca  +, migraines, b12 def  Had surgery for volvulus  Otherwise feels well    Review of Systems  As above    Objective   /68 (BP Location: Right arm, Patient Position: Sitting, BP Cuff Size: Adult)   Pulse 84   Ht 1.772 m (5' 9.75\")   Wt 87.3 kg (192 lb 6.4 oz)   SpO2 97%   BMI 27.80 kg/m²     Physical Exam  GEN nad, Affect wnl  Heent ncat, eomfg, face symmetric  Skin good color  Resp breathing easily  No p/m retardation or agitation  Thinking appropriate  Oriented    Assessment/Plan   Diagnoses and all orders for this visit:  B12 deficiency  Other migraine without status migrainosus, not intractable  Irritable bowel syndrome with constipation  Psoriasis  SS-A antibody positive  Depression, unspecified depression type  Vitamin D deficiency     Seems better overall  Get labs overall  Wishes to cx cardio fu and go back   "

## 2025-04-25 LAB
25(OH)D3+25(OH)D2 SERPL-MCNC: 42 NG/ML (ref 30–100)
ALBUMIN SERPL-MCNC: 4.2 G/DL (ref 3.6–5.1)
ALP SERPL-CCNC: 59 U/L (ref 31–125)
ALT SERPL-CCNC: 22 U/L (ref 6–29)
ANION GAP SERPL CALCULATED.4IONS-SCNC: 10 MMOL/L (CALC) (ref 7–17)
APPEARANCE UR: CLEAR
AST SERPL-CCNC: 23 U/L (ref 10–35)
BASOPHILS # BLD AUTO: 43 CELLS/UL (ref 0–200)
BASOPHILS NFR BLD AUTO: 0.6 %
BILIRUB SERPL-MCNC: 0.4 MG/DL (ref 0.2–1.2)
BILIRUB UR QL STRIP: NEGATIVE
BUN SERPL-MCNC: 13 MG/DL (ref 7–25)
CALCIUM SERPL-MCNC: 9.1 MG/DL (ref 8.6–10.2)
CHLORIDE SERPL-SCNC: 102 MMOL/L (ref 98–110)
CHOLEST SERPL-MCNC: 186 MG/DL
CHOLEST/HDLC SERPL: 3.7 (CALC)
CO2 SERPL-SCNC: 25 MMOL/L (ref 20–32)
COLOR UR: YELLOW
CREAT SERPL-MCNC: 0.76 MG/DL (ref 0.5–0.99)
EGFRCR SERPLBLD CKD-EPI 2021: 97 ML/MIN/1.73M2
EOSINOPHIL # BLD AUTO: 78 CELLS/UL (ref 15–500)
EOSINOPHIL NFR BLD AUTO: 1.1 %
ERYTHROCYTE [DISTWIDTH] IN BLOOD BY AUTOMATED COUNT: 11.9 % (ref 11–15)
GLUCOSE SERPL-MCNC: 74 MG/DL (ref 65–139)
GLUCOSE UR QL STRIP: NEGATIVE
HCT VFR BLD AUTO: 37.8 % (ref 35–45)
HDLC SERPL-MCNC: 50 MG/DL
HGB BLD-MCNC: 12.2 G/DL (ref 11.7–15.5)
HGB UR QL STRIP: NEGATIVE
KETONES UR QL STRIP: ABNORMAL
LDLC SERPL CALC-MCNC: 117 MG/DL (CALC)
LEUKOCYTE ESTERASE UR QL STRIP: NEGATIVE
LYMPHOCYTES # BLD AUTO: 1640 CELLS/UL (ref 850–3900)
LYMPHOCYTES NFR BLD AUTO: 23.1 %
MCH RBC QN AUTO: 28.8 PG (ref 27–33)
MCHC RBC AUTO-ENTMCNC: 32.3 G/DL (ref 32–36)
MCV RBC AUTO: 89.2 FL (ref 80–100)
MONOCYTES # BLD AUTO: 696 CELLS/UL (ref 200–950)
MONOCYTES NFR BLD AUTO: 9.8 %
NEUTROPHILS # BLD AUTO: 4643 CELLS/UL (ref 1500–7800)
NEUTROPHILS NFR BLD AUTO: 65.4 %
NITRITE UR QL STRIP: NEGATIVE
NONHDLC SERPL-MCNC: 136 MG/DL (CALC)
PH UR STRIP: ABNORMAL [PH] (ref 5–8)
PLATELET # BLD AUTO: 288 THOUSAND/UL (ref 140–400)
PMV BLD REES-ECKER: 11.1 FL (ref 7.5–12.5)
POTASSIUM SERPL-SCNC: 3.9 MMOL/L (ref 3.5–5.3)
PROT SERPL-MCNC: 7.1 G/DL (ref 6.1–8.1)
PROT UR QL STRIP: NEGATIVE
RBC # BLD AUTO: 4.24 MILLION/UL (ref 3.8–5.1)
SODIUM SERPL-SCNC: 137 MMOL/L (ref 135–146)
SP GR UR STRIP: 1.01 (ref 1–1.03)
TRIGL SERPL-MCNC: 89 MG/DL
TSH SERPL-ACNC: 1.89 MIU/L
VIT B12 SERPL-MCNC: 352 PG/ML (ref 200–1100)
WBC # BLD AUTO: 7.1 THOUSAND/UL (ref 3.8–10.8)

## 2025-04-29 ENCOUNTER — PROCEDURE VISIT (OUTPATIENT)
Dept: GASTROENTEROLOGY | Facility: CLINIC | Age: 50
End: 2025-04-29
Payer: COMMERCIAL

## 2025-04-29 DIAGNOSIS — R14.0 ABDOMINAL BLOATING: ICD-10-CM

## 2025-04-29 PROCEDURE — 91065 BREATH HYDROGEN/METHANE TEST: CPT | Performed by: INTERNAL MEDICINE

## 2025-04-29 PROCEDURE — 91065 BREATH HYDROGEN/METHANE TEST: CPT

## 2025-04-29 NOTE — PROGRESS NOTES
Patient ID: Cortney Kaufman is a 49 y.o. female.    Breath Hydrogen Test-Bacterial Overgrowth    Date/Time: 4/29/2025 11:20 AM    Performed by: Leigh Ann Patten MA  Authorized by: Ami Olsen MD    Consent:     Consent obtained:  Verbal    Consent given by:  Patient  Universal protocol:     Procedure explained and questions answered to patient or proxy's satisfaction: yes      Relevant documents present and verified: yes      Test results available: yes      Patient identity confirmed:  Verbally with patient  Sedation:     Sedation type:  None  Anesthesia:     Anesthesia method:  None  Post-procedure details:     Procedure completion:  Tolerated  Hydrogen Breath Analysis Consultation Sheet    Referring Provider: Ami Olsen MD  [address] [facility], [place] [address]    Indication: R/O SIBO    Symptoms: Abdominal bloating (R14.0)    Age: 49 y.o.  Weight: There were no vitals filed for this visit.  Substrate: Glucose   Dose: 75 grams    Last Meal: 4/28/25 2000  Recent Antibiotics: Denies    RESULTS:   Time PPM (H2) APPM* (CH4) CO2 Correction   Baseline #1 0905 3 0 3.3 1.66   Baseline #2 0910 3 2 3.3 1.66   *Challenge Dose Sugar: 0915  15' 0930 5 2 3.4 1.61   30' 0945 5 2 3.2 1.71   45' 1000 4 0 3.9 1.41   60' 1015 5 2 3.5 1.57   75' 1030 4 1 3.8 1.44   90' 1045 4 1 3.7 1.48   105'        120'        135'        150'        165'        180'          Impression: Negative for SIBO

## 2025-07-22 ENCOUNTER — APPOINTMENT (OUTPATIENT)
Dept: CARDIOLOGY | Facility: CLINIC | Age: 50
End: 2025-07-22
Payer: COMMERCIAL

## 2025-10-16 ENCOUNTER — APPOINTMENT (OUTPATIENT)
Dept: GASTROENTEROLOGY | Facility: CLINIC | Age: 50
End: 2025-10-16
Payer: COMMERCIAL